# Patient Record
Sex: FEMALE | Race: WHITE | Employment: UNEMPLOYED | URBAN - NONMETROPOLITAN AREA
[De-identification: names, ages, dates, MRNs, and addresses within clinical notes are randomized per-mention and may not be internally consistent; named-entity substitution may affect disease eponyms.]

---

## 2018-04-18 ENCOUNTER — LAB ENCOUNTER (OUTPATIENT)
Dept: LAB | Age: 38
End: 2018-04-18
Attending: FAMILY MEDICINE
Payer: COMMERCIAL

## 2018-04-18 ENCOUNTER — OFFICE VISIT (OUTPATIENT)
Dept: FAMILY MEDICINE CLINIC | Facility: CLINIC | Age: 38
End: 2018-04-18

## 2018-04-18 VITALS
BODY MASS INDEX: 39.06 KG/M2 | WEIGHT: 223.25 LBS | TEMPERATURE: 98 F | DIASTOLIC BLOOD PRESSURE: 68 MMHG | HEIGHT: 63.5 IN | HEART RATE: 66 BPM | SYSTOLIC BLOOD PRESSURE: 110 MMHG | OXYGEN SATURATION: 98 %

## 2018-04-18 DIAGNOSIS — L85.3 DRY SKIN: ICD-10-CM

## 2018-04-18 DIAGNOSIS — R63.5 WEIGHT GAIN: ICD-10-CM

## 2018-04-18 DIAGNOSIS — E65 OBESE ABDOMEN: ICD-10-CM

## 2018-04-18 DIAGNOSIS — L85.3 DRY SKIN: Primary | ICD-10-CM

## 2018-04-18 PROCEDURE — 80061 LIPID PANEL: CPT

## 2018-04-18 PROCEDURE — 36415 COLL VENOUS BLD VENIPUNCTURE: CPT

## 2018-04-18 PROCEDURE — 99214 OFFICE O/P EST MOD 30 MIN: CPT | Performed by: INTERNAL MEDICINE

## 2018-04-18 PROCEDURE — 84443 ASSAY THYROID STIM HORMONE: CPT

## 2018-04-18 PROCEDURE — 80050 GENERAL HEALTH PANEL: CPT

## 2018-04-18 PROCEDURE — 85025 COMPLETE CBC W/AUTO DIFF WBC: CPT

## 2018-04-18 RX ORDER — ACETAMINOPHEN AND CODEINE PHOSPHATE 120; 12 MG/5ML; MG/5ML
0.35 SOLUTION ORAL DAILY
Refills: 11 | COMMUNITY
Start: 2018-04-14 | End: 2019-09-11 | Stop reason: ALTCHOICE

## 2018-04-18 RX ORDER — BETAMETHASONE DIPROPIONATE 0.5 MG/G
1 CREAM TOPICAL 2 TIMES DAILY
Qty: 50 G | Refills: 0 | Status: SHIPPED | OUTPATIENT
Start: 2018-04-18 | End: 2018-05-18

## 2018-04-18 NOTE — PROGRESS NOTES
Corinna New is a 30year old female. HPI:   Dry patches on the left lateral leg, father and uncle have psoriasis. She has no joint swelling, but pain in the right knee occasionally. She finds it very hard to lose weight.   She has a strong family hx of he and obesity.      Orders Placed This Encounter      TSH      Lipid Panel [E]      Comp Metabolic Panel (14) [E]      CBC W Differential W Platelet [E]    Meds & Refills for this Visit:  Signed Prescriptions Disp Refills    Betamethasone Dipropionate Aug 0.0

## 2019-03-13 ENCOUNTER — PATIENT OUTREACH (OUTPATIENT)
Dept: FAMILY MEDICINE CLINIC | Facility: CLINIC | Age: 39
End: 2019-03-13

## 2019-09-05 ENCOUNTER — TELEPHONE (OUTPATIENT)
Dept: FAMILY MEDICINE CLINIC | Facility: CLINIC | Age: 39
End: 2019-09-05

## 2019-09-11 ENCOUNTER — OFFICE VISIT (OUTPATIENT)
Dept: FAMILY MEDICINE CLINIC | Facility: CLINIC | Age: 39
End: 2019-09-11

## 2019-09-11 ENCOUNTER — LABORATORY ENCOUNTER (OUTPATIENT)
Dept: LAB | Age: 39
End: 2019-09-11
Attending: FAMILY MEDICINE
Payer: COMMERCIAL

## 2019-09-11 VITALS
BODY MASS INDEX: 34.3 KG/M2 | HEART RATE: 66 BPM | DIASTOLIC BLOOD PRESSURE: 76 MMHG | WEIGHT: 196 LBS | SYSTOLIC BLOOD PRESSURE: 100 MMHG | RESPIRATION RATE: 20 BRPM | HEIGHT: 63.5 IN | TEMPERATURE: 99 F

## 2019-09-11 DIAGNOSIS — Z00.00 WELL ADULT EXAM: ICD-10-CM

## 2019-09-11 DIAGNOSIS — Z00.00 WELL ADULT EXAM: Primary | ICD-10-CM

## 2019-09-11 LAB
ALBUMIN SERPL-MCNC: 4 G/DL (ref 3.4–5)
ALBUMIN/GLOB SERPL: 1.3 {RATIO} (ref 1–2)
ALP LIVER SERPL-CCNC: 59 U/L (ref 37–98)
ALT SERPL-CCNC: 23 U/L (ref 13–56)
ANION GAP SERPL CALC-SCNC: 9 MMOL/L (ref 0–18)
AST SERPL-CCNC: 11 U/L (ref 15–37)
BASOPHILS # BLD AUTO: 0.02 X10(3) UL (ref 0–0.2)
BASOPHILS NFR BLD AUTO: 0.3 %
BILIRUB SERPL-MCNC: 0.5 MG/DL (ref 0.1–2)
BUN BLD-MCNC: 15 MG/DL (ref 7–18)
BUN/CREAT SERPL: 17.6 (ref 10–20)
CALCIUM BLD-MCNC: 8.2 MG/DL (ref 8.5–10.1)
CHLORIDE SERPL-SCNC: 107 MMOL/L (ref 98–112)
CHOLEST SMN-MCNC: 240 MG/DL (ref ?–200)
CO2 SERPL-SCNC: 24 MMOL/L (ref 21–32)
CREAT BLD-MCNC: 0.85 MG/DL (ref 0.55–1.02)
DEPRECATED RDW RBC AUTO: 46.2 FL (ref 35.1–46.3)
EOSINOPHIL # BLD AUTO: 0.16 X10(3) UL (ref 0–0.7)
EOSINOPHIL NFR BLD AUTO: 2.6 %
ERYTHROCYTE [DISTWIDTH] IN BLOOD BY AUTOMATED COUNT: 13.6 % (ref 11–15)
GLOBULIN PLAS-MCNC: 3.1 G/DL (ref 2.8–4.4)
GLUCOSE BLD-MCNC: 92 MG/DL (ref 70–99)
HCT VFR BLD AUTO: 40.3 % (ref 35–48)
HDLC SERPL-MCNC: 47 MG/DL (ref 40–59)
HGB BLD-MCNC: 13.6 G/DL (ref 12–16)
IMM GRANULOCYTES # BLD AUTO: 0.02 X10(3) UL (ref 0–1)
IMM GRANULOCYTES NFR BLD: 0.3 %
LDLC SERPL CALC-MCNC: 173 MG/DL (ref ?–100)
LYMPHOCYTES # BLD AUTO: 1.75 X10(3) UL (ref 1–4)
LYMPHOCYTES NFR BLD AUTO: 28.8 %
M PROTEIN MFR SERPL ELPH: 7.1 G/DL (ref 6.4–8.2)
MCH RBC QN AUTO: 31 PG (ref 26–34)
MCHC RBC AUTO-ENTMCNC: 33.7 G/DL (ref 31–37)
MCV RBC AUTO: 91.8 FL (ref 80–100)
MONOCYTES # BLD AUTO: 0.56 X10(3) UL (ref 0.1–1)
MONOCYTES NFR BLD AUTO: 9.2 %
NEUTROPHILS # BLD AUTO: 3.56 X10 (3) UL (ref 1.5–7.7)
NEUTROPHILS # BLD AUTO: 3.56 X10(3) UL (ref 1.5–7.7)
NEUTROPHILS NFR BLD AUTO: 58.8 %
NONHDLC SERPL-MCNC: 193 MG/DL (ref ?–130)
OSMOLALITY SERPL CALC.SUM OF ELEC: 290 MOSM/KG (ref 275–295)
PLATELET # BLD AUTO: 252 10(3)UL (ref 150–450)
POTASSIUM SERPL-SCNC: 3.7 MMOL/L (ref 3.5–5.1)
RBC # BLD AUTO: 4.39 X10(6)UL (ref 3.8–5.3)
SODIUM SERPL-SCNC: 140 MMOL/L (ref 136–145)
TRIGL SERPL-MCNC: 99 MG/DL (ref 30–149)
VLDLC SERPL CALC-MCNC: 20 MG/DL (ref 0–30)
WBC # BLD AUTO: 6.1 X10(3) UL (ref 4–11)

## 2019-09-11 PROCEDURE — 88175 CYTOPATH C/V AUTO FLUID REDO: CPT | Performed by: INTERNAL MEDICINE

## 2019-09-11 PROCEDURE — 85025 COMPLETE CBC W/AUTO DIFF WBC: CPT

## 2019-09-11 PROCEDURE — 80061 LIPID PANEL: CPT

## 2019-09-11 PROCEDURE — 99395 PREV VISIT EST AGE 18-39: CPT | Performed by: INTERNAL MEDICINE

## 2019-09-11 PROCEDURE — 36415 COLL VENOUS BLD VENIPUNCTURE: CPT

## 2019-09-11 PROCEDURE — 80053 COMPREHEN METABOLIC PANEL: CPT

## 2019-09-11 NOTE — PROGRESS NOTES
HPI:   Yari Villa is a 42year old female who presents for a complete physical exam. Symptoms: denies discharge, itching, burning or dysuria, periods are regular. Patient complains of nothing.        There is no immunization history on file for this auorra nourished,in no apparent distress  SKIN: no rashes,no suspicious lesions  HEENT: atraumatic, normocephalic,ears and throat are clear  EYES:PERRLA, EOMI, normal optic disk,conjunctiva are clear  NECK: supple,no adenopathy,no bruits  CHEST: no chest tenderne

## 2019-09-12 DIAGNOSIS — E78.00 ELEVATED CHOLESTEROL: Primary | ICD-10-CM

## 2020-01-21 ENCOUNTER — TELEPHONE (OUTPATIENT)
Dept: FAMILY MEDICINE CLINIC | Facility: CLINIC | Age: 40
End: 2020-01-21

## 2020-01-21 DIAGNOSIS — Z00.00 WELL ADULT EXAM: Primary | ICD-10-CM

## 2020-01-21 NOTE — TELEPHONE ENCOUNTER
No, but sometimes eating habits changes with moods, I will order labs and she can decide if this is the right time.

## 2020-01-21 NOTE — TELEPHONE ENCOUNTER
Pt. Called and scheduled her fasting lab appt. For this Thursday. She is asking if stress could affect the results as she lost her grandmother and dog all in the last week.

## 2020-02-05 ENCOUNTER — LABORATORY ENCOUNTER (OUTPATIENT)
Dept: LAB | Age: 40
End: 2020-02-05
Attending: FAMILY MEDICINE
Payer: COMMERCIAL

## 2020-02-05 ENCOUNTER — OFFICE VISIT (OUTPATIENT)
Dept: FAMILY MEDICINE CLINIC | Facility: CLINIC | Age: 40
End: 2020-02-05

## 2020-02-05 VITALS
DIASTOLIC BLOOD PRESSURE: 74 MMHG | WEIGHT: 188 LBS | OXYGEN SATURATION: 98 % | HEART RATE: 69 BPM | HEIGHT: 63.5 IN | RESPIRATION RATE: 16 BRPM | BODY MASS INDEX: 32.9 KG/M2 | SYSTOLIC BLOOD PRESSURE: 110 MMHG

## 2020-02-05 DIAGNOSIS — Z00.00 WELL ADULT EXAM: ICD-10-CM

## 2020-02-05 DIAGNOSIS — L02.412 CUTANEOUS ABSCESS OF LEFT AXILLA: Primary | ICD-10-CM

## 2020-02-05 DIAGNOSIS — E78.00 ELEVATED CHOLESTEROL: ICD-10-CM

## 2020-02-05 LAB
ALBUMIN SERPL-MCNC: 4 G/DL (ref 3.4–5)
ALBUMIN/GLOB SERPL: 1.1 {RATIO} (ref 1–2)
ALP LIVER SERPL-CCNC: 51 U/L (ref 37–98)
ALT SERPL-CCNC: 21 U/L (ref 13–56)
ANION GAP SERPL CALC-SCNC: 6 MMOL/L (ref 0–18)
AST SERPL-CCNC: 11 U/L (ref 15–37)
BASOPHILS # BLD AUTO: 0.04 X10(3) UL (ref 0–0.2)
BASOPHILS NFR BLD AUTO: 0.5 %
BILIRUB SERPL-MCNC: 0.5 MG/DL (ref 0.1–2)
BUN BLD-MCNC: 10 MG/DL (ref 7–18)
BUN/CREAT SERPL: 11.4 (ref 10–20)
CALCIUM BLD-MCNC: 9.2 MG/DL (ref 8.5–10.1)
CHLORIDE SERPL-SCNC: 105 MMOL/L (ref 98–112)
CHOLEST SMN-MCNC: 233 MG/DL (ref ?–200)
CO2 SERPL-SCNC: 25 MMOL/L (ref 21–32)
CREAT BLD-MCNC: 0.88 MG/DL (ref 0.55–1.02)
DEPRECATED RDW RBC AUTO: 46.5 FL (ref 35.1–46.3)
EOSINOPHIL # BLD AUTO: 0.19 X10(3) UL (ref 0–0.7)
EOSINOPHIL NFR BLD AUTO: 2.3 %
ERYTHROCYTE [DISTWIDTH] IN BLOOD BY AUTOMATED COUNT: 13.5 % (ref 11–15)
GLOBULIN PLAS-MCNC: 3.5 G/DL (ref 2.8–4.4)
GLUCOSE BLD-MCNC: 88 MG/DL (ref 70–99)
HCT VFR BLD AUTO: 41.4 % (ref 35–48)
HDLC SERPL-MCNC: 55 MG/DL (ref 40–59)
HGB BLD-MCNC: 13.8 G/DL (ref 12–16)
IMM GRANULOCYTES # BLD AUTO: 0.02 X10(3) UL (ref 0–1)
IMM GRANULOCYTES NFR BLD: 0.2 %
LDLC SERPL CALC-MCNC: 165 MG/DL (ref ?–100)
LYMPHOCYTES # BLD AUTO: 1.55 X10(3) UL (ref 1–4)
LYMPHOCYTES NFR BLD AUTO: 18.9 %
M PROTEIN MFR SERPL ELPH: 7.5 G/DL (ref 6.4–8.2)
MCH RBC QN AUTO: 31.2 PG (ref 26–34)
MCHC RBC AUTO-ENTMCNC: 33.3 G/DL (ref 31–37)
MCV RBC AUTO: 93.7 FL (ref 80–100)
MONOCYTES # BLD AUTO: 0.57 X10(3) UL (ref 0.1–1)
MONOCYTES NFR BLD AUTO: 7 %
NEUTROPHILS # BLD AUTO: 5.81 X10 (3) UL (ref 1.5–7.7)
NEUTROPHILS # BLD AUTO: 5.81 X10(3) UL (ref 1.5–7.7)
NEUTROPHILS NFR BLD AUTO: 71.1 %
NONHDLC SERPL-MCNC: 178 MG/DL (ref ?–130)
OSMOLALITY SERPL CALC.SUM OF ELEC: 280 MOSM/KG (ref 275–295)
PATIENT FASTING Y/N/NP: YES
PATIENT FASTING Y/N/NP: YES
PLATELET # BLD AUTO: 282 10(3)UL (ref 150–450)
POTASSIUM SERPL-SCNC: 3.9 MMOL/L (ref 3.5–5.1)
RBC # BLD AUTO: 4.42 X10(6)UL (ref 3.8–5.3)
SODIUM SERPL-SCNC: 136 MMOL/L (ref 136–145)
TRIGL SERPL-MCNC: 66 MG/DL (ref 30–149)
VLDLC SERPL CALC-MCNC: 13 MG/DL (ref 0–30)
WBC # BLD AUTO: 8.2 X10(3) UL (ref 4–11)

## 2020-02-05 PROCEDURE — 85025 COMPLETE CBC W/AUTO DIFF WBC: CPT

## 2020-02-05 PROCEDURE — 80053 COMPREHEN METABOLIC PANEL: CPT

## 2020-02-05 PROCEDURE — 99214 OFFICE O/P EST MOD 30 MIN: CPT | Performed by: INTERNAL MEDICINE

## 2020-02-05 PROCEDURE — 80061 LIPID PANEL: CPT

## 2020-02-05 PROCEDURE — 36415 COLL VENOUS BLD VENIPUNCTURE: CPT

## 2020-02-05 RX ORDER — SULFAMETHOXAZOLE AND TRIMETHOPRIM 800; 160 MG/1; MG/1
1 TABLET ORAL 2 TIMES DAILY
Qty: 28 TABLET | Refills: 0 | Status: SHIPPED | OUTPATIENT
Start: 2020-02-05 | End: 2020-02-19

## 2020-02-05 NOTE — PROGRESS NOTES
Jen Syed is a 42year old female. HPI:   Pt has been having pain in the left lateral chest for the last 6 days. Red, warm, tender spot 10 cm inferior to axilla.    Current Outpatient Medications   Medication Sig Dispense Refill   • Sulfamethoxazole-T tablet 0     Sig: Take 1 tablet by mouth 2 (two) times daily for 14 days. Imaging & Consults:  None    Follow up as needed. The patient indicates understanding of these issues and agrees to the plan.

## 2020-05-20 ENCOUNTER — VIRTUAL PHONE E/M (OUTPATIENT)
Dept: FAMILY MEDICINE CLINIC | Facility: CLINIC | Age: 40
End: 2020-05-20

## 2020-05-20 DIAGNOSIS — J01.00 ACUTE NON-RECURRENT MAXILLARY SINUSITIS: Primary | ICD-10-CM

## 2020-05-20 PROCEDURE — 99213 OFFICE O/P EST LOW 20 MIN: CPT | Performed by: INTERNAL MEDICINE

## 2020-05-20 RX ORDER — PREDNISONE 20 MG/1
40 TABLET ORAL DAILY
Qty: 14 TABLET | Refills: 0 | Status: SHIPPED | OUTPATIENT
Start: 2020-05-20 | End: 2020-05-27

## 2020-05-20 RX ORDER — CIPROFLOXACIN 500 MG/1
500 TABLET, FILM COATED ORAL 2 TIMES DAILY
Qty: 20 TABLET | Refills: 0 | Status: SHIPPED | OUTPATIENT
Start: 2020-05-20 | End: 2020-05-30

## 2020-05-20 NOTE — PROGRESS NOTES
HPI:   Kimberly Wilkins is a 42year old female who presents for upper respiratory symptoms for  14  days. Patient reports sore throat only at the beginning of sx's, congestion, dry cough, cough is keeping pt up at night.     Current Outpatient Medications   M issues and agrees to the plan. The patient is asked to return if sx's persist or worsen.

## 2020-05-21 ENCOUNTER — TELEPHONE (OUTPATIENT)
Dept: FAMILY MEDICINE CLINIC | Facility: CLINIC | Age: 40
End: 2020-05-21

## 2020-05-21 RX ORDER — PROMETHAZINE HYDROCHLORIDE AND CODEINE PHOSPHATE 6.25; 1 MG/5ML; MG/5ML
5 SOLUTION ORAL NIGHTLY
Qty: 50 ML | Refills: 0 | Status: SHIPPED | OUTPATIENT
Start: 2020-05-21 | End: 2020-06-04

## 2020-05-21 NOTE — TELEPHONE ENCOUNTER
Patient states that she took 2 of the prednisone at the same time last night. She became very hot and flushed. Patient is asking if she should take BID instead of once daily.   She also states that she did not sleep last night because she was up coughing

## 2020-05-28 ENCOUNTER — TELEPHONE (OUTPATIENT)
Dept: FAMILY MEDICINE CLINIC | Facility: CLINIC | Age: 40
End: 2020-05-28

## 2020-05-28 NOTE — TELEPHONE ENCOUNTER
Spoke with pt. States she had a virtual visit with Dr. Radha Buenrostro on 5/20 for sinusitis. Was prescribed prednisone x7days and Cipro [id number]. C/o Cipro causing her to feel \"loopy\", dizzy, insomnia, nightmares, nausea.    States this started a few days ago, so

## 2020-05-28 NOTE — TELEPHONE ENCOUNTER
Ciprofloxacin HCl (CIPRO) 500 MG Oral Tab  She said she is having terrible side effects, please call pt.

## 2020-06-22 ENCOUNTER — TELEPHONE (OUTPATIENT)
Dept: FAMILY MEDICINE CLINIC | Facility: CLINIC | Age: 40
End: 2020-06-22

## 2020-06-22 ENCOUNTER — TELEMEDICINE (OUTPATIENT)
Dept: FAMILY MEDICINE CLINIC | Facility: CLINIC | Age: 40
End: 2020-06-22

## 2020-06-22 DIAGNOSIS — J01.91 ACUTE RECURRENT SINUSITIS, UNSPECIFIED LOCATION: Primary | ICD-10-CM

## 2020-06-22 DIAGNOSIS — J30.9 ALLERGIC RHINITIS, UNSPECIFIED SEASONALITY, UNSPECIFIED TRIGGER: ICD-10-CM

## 2020-06-22 DIAGNOSIS — R06.02 SOB (SHORTNESS OF BREATH) ON EXERTION: ICD-10-CM

## 2020-06-22 DIAGNOSIS — R05.3 PERSISTENT COUGH: ICD-10-CM

## 2020-06-22 PROCEDURE — 99213 OFFICE O/P EST LOW 20 MIN: CPT | Performed by: NURSE PRACTITIONER

## 2020-06-22 RX ORDER — PREDNISONE 20 MG/1
20 TABLET ORAL 2 TIMES DAILY
Qty: 10 TABLET | Refills: 0 | Status: SHIPPED | OUTPATIENT
Start: 2020-06-22 | End: 2020-06-27

## 2020-06-22 RX ORDER — AZITHROMYCIN 250 MG/1
TABLET, FILM COATED ORAL
Qty: 6 TABLET | Refills: 0 | Status: SHIPPED | OUTPATIENT
Start: 2020-06-22 | End: 2020-06-27

## 2020-06-22 RX ORDER — ALBUTEROL SULFATE 90 UG/1
2 AEROSOL, METERED RESPIRATORY (INHALATION) EVERY 4 HOURS PRN
Qty: 1 INHALER | Refills: 0 | Status: SHIPPED | OUTPATIENT
Start: 2020-06-22 | End: 2020-09-09

## 2020-06-22 NOTE — PROGRESS NOTES
Virtual/Telephone Check-In    Shantanu Pulido  verbally consents to a [employer] on 6/22/2020 . Patient understands and accepts financial responsibility for any deductible, co-insurance and/or co-pays associated with this service. OTHER (SEE COMMENTS)    Comment:Felt \"loopy\"; dizzy.  Insomnia, nightmares, nausea    Current Outpatient Medications   Medication Sig Dispense Refill   • azithromycin 250 MG Oral Tab Take 2 tablets (500 mg total) by mouth daily for 1 day, THEN 1 tablet (2 unspecified seasonality, unspecified trigger    Problem List Items Addressed This Visit     None      Visit Diagnoses     Acute recurrent sinusitis, unspecified location    -  Primary    Relevant Medications    azithromycin 250 MG Oral Tab    Persistent co for 4 days. • predniSONE 20 MG Oral Tab 10 tablet 0     Sig: Take 1 tablet (20 mg total) by mouth 2 (two) times daily for 5 days.    • Albuterol Sulfate  (90 Base) MCG/ACT Inhalation Aero Soln 1 Inhaler 0     Sig: Inhale 2 puffs into the lungs ever

## 2020-06-22 NOTE — TELEPHONE ENCOUNTER
Virtual/Telephone Check-In    Shantanu Pulido verbally {consents to a [facility] on 06/22/20. Patient has been referred to the [facility] website at [email or web address] to review the yearly Consent to Treat document.   Patient Nisreen

## 2020-06-23 ENCOUNTER — LAB ENCOUNTER (OUTPATIENT)
Dept: LAB | Facility: HOSPITAL | Age: 40
End: 2020-06-23
Attending: NURSE PRACTITIONER
Payer: COMMERCIAL

## 2020-06-23 DIAGNOSIS — R05.3 PERSISTENT COUGH: ICD-10-CM

## 2020-06-23 DIAGNOSIS — R06.02 SOB (SHORTNESS OF BREATH) ON EXERTION: ICD-10-CM

## 2020-06-25 ENCOUNTER — OFFICE VISIT (OUTPATIENT)
Dept: FAMILY MEDICINE CLINIC | Facility: CLINIC | Age: 40
End: 2020-06-25

## 2020-06-25 ENCOUNTER — HOSPITAL ENCOUNTER (OUTPATIENT)
Dept: GENERAL RADIOLOGY | Age: 40
Discharge: HOME | End: 2020-06-25
Attending: NURSE PRACTITIONER
Payer: COMMERCIAL

## 2020-06-25 VITALS
BODY MASS INDEX: 33.62 KG/M2 | RESPIRATION RATE: 16 BRPM | SYSTOLIC BLOOD PRESSURE: 112 MMHG | HEART RATE: 68 BPM | WEIGHT: 192.13 LBS | TEMPERATURE: 98 F | OXYGEN SATURATION: 99 % | HEIGHT: 63.5 IN | DIASTOLIC BLOOD PRESSURE: 78 MMHG

## 2020-06-25 DIAGNOSIS — R05.3 PERSISTENT COUGH: ICD-10-CM

## 2020-06-25 DIAGNOSIS — R05.9 COUGH: Primary | ICD-10-CM

## 2020-06-25 DIAGNOSIS — J20.9 ACUTE BRONCHITIS, UNSPECIFIED ORGANISM: Primary | ICD-10-CM

## 2020-06-25 DIAGNOSIS — R05.9 COUGH: ICD-10-CM

## 2020-06-25 PROCEDURE — 99214 OFFICE O/P EST MOD 30 MIN: CPT | Performed by: NURSE PRACTITIONER

## 2020-06-25 PROCEDURE — 71046 X-RAY EXAM CHEST 2 VIEWS: CPT | Performed by: NURSE PRACTITIONER

## 2020-06-25 RX ORDER — CEFUROXIME AXETIL 250 MG/1
250 TABLET ORAL 2 TIMES DAILY
Qty: 14 TABLET | Refills: 0 | Status: SHIPPED | OUTPATIENT
Start: 2020-06-25 | End: 2020-07-02

## 2020-06-25 RX ORDER — ALBUTEROL SULFATE 2.5 MG/3ML
2.5 SOLUTION RESPIRATORY (INHALATION) EVERY 4 HOURS PRN
Qty: 1 BOX | Refills: 0 | Status: SHIPPED | OUTPATIENT
Start: 2020-06-25 | End: 2020-11-10

## 2020-06-25 NOTE — PROGRESS NOTES
HPI:   Cough   This is a recurrent problem. Episode onset: about 1 week ago, had telehealth visit on 6/22, tested negative for COVID 19.  The problem has been gradually improving (took last dose of Azithromycin today, will take last dose of Prednisone ton Current Every Day Smoker        Packs/day: 0.50      Smokeless tobacco: Never Used      Tobacco comment: decreasing    Alcohol use: No    Drug use: No        REVIEW OF SYSTEMS:   Review of Systems   Constitutional: Negative for chills, fatigue and fever.

## 2020-07-07 ENCOUNTER — TELEPHONE (OUTPATIENT)
Dept: FAMILY MEDICINE CLINIC | Facility: CLINIC | Age: 40
End: 2020-07-07

## 2020-07-07 NOTE — TELEPHONE ENCOUNTER
Pt came back from vacation with her family and was tested for covid. Her family came back negative and she tested positive, how can that be? Wants to go get rapid test done in [place]. Will Rosanna order?  I tried to make a appt but she wants to talk to the [facility]

## 2020-07-07 NOTE — TELEPHONE ENCOUNTER
I think its possible, but they would all have to be re screened to prove she was the false positive. If I order it it has to be done through Brenda Gomez.  Or she has to go to an [facility] site

## 2020-07-07 NOTE — TELEPHONE ENCOUNTER
Patient said that they drove down to [place] and came back on Sunday and got tested at a government facility in [facility] where her  works.  She said that she and her family have not been social distancing and does not understand why she would be

## 2020-07-07 NOTE — TELEPHONE ENCOUNTER
Patient advised. She said she was hoping for a rapid test. She said that she will look on the [facility] website to find a rapid testing facility. Advised they all need to quarantine until results obtained.

## 2020-08-03 ENCOUNTER — TELEMEDICINE (OUTPATIENT)
Dept: FAMILY MEDICINE CLINIC | Facility: CLINIC | Age: 40
End: 2020-08-03

## 2020-08-03 DIAGNOSIS — J20.9 BRONCHITIS WITH BRONCHOSPASM: Primary | ICD-10-CM

## 2020-08-03 PROCEDURE — 99214 OFFICE O/P EST MOD 30 MIN: CPT | Performed by: INTERNAL MEDICINE

## 2020-08-03 RX ORDER — AMOXICILLIN AND CLAVULANATE POTASSIUM 875; 125 MG/1; MG/1
1 TABLET, FILM COATED ORAL 2 TIMES DAILY
Qty: 20 TABLET | Refills: 0 | Status: SHIPPED | OUTPATIENT
Start: 2020-08-03 | End: 2020-08-13

## 2020-08-03 RX ORDER — PREDNISONE 20 MG/1
TABLET ORAL
Qty: 18 TABLET | Refills: 0 | Status: SHIPPED | OUTPATIENT
Start: 2020-08-03 | End: 2020-08-13

## 2020-08-03 RX ORDER — ALBUTEROL SULFATE 2.5 MG/3ML
2.5 SOLUTION RESPIRATORY (INHALATION) EVERY 6 HOURS PRN
Qty: 50 VIAL | Refills: 1 | Status: SHIPPED | OUTPATIENT
Start: 2020-08-03 | End: 2020-08-17

## 2020-08-03 NOTE — PROGRESS NOTES
HPI:   Oral Luna is a 47year old female who presents for upper respiratory symptoms for  6  days. Patient reports congestion, dry cough, OTC cold meds have not been helping. Using nebulizer and zytrec. No fever, no sweats, no chills.   Had COVID keren pain  NEURO: denies headaches    EXAM:   [facility] 06/10/2020     ASSESSMENT AND PLAN:   Niko Cosme is a 47year old female who presents with Bronchitis.  PLAN: albuterol, steroid and antibiotic and if no impovement in 2 days consider other non-infectious etio

## 2020-08-06 ENCOUNTER — TELEPHONE (OUTPATIENT)
Dept: FAMILY MEDICINE CLINIC | Facility: CLINIC | Age: 40
End: 2020-08-06

## 2020-08-06 RX ORDER — CETIRIZINE HYDROCHLORIDE 10 MG/1
10 TABLET ORAL DAILY
Qty: 30 TABLET | Refills: 0 | Status: SHIPPED | OUTPATIENT
Start: 2020-08-06 | End: 2020-09-09

## 2020-08-06 NOTE — TELEPHONE ENCOUNTER
I can do that, but I think if she is that bad we need to rule out PE or other noninfectious problem that cause SOB.

## 2020-08-06 NOTE — TELEPHONE ENCOUNTER
Patient is still SOB especially with exertion and has chest heaviness with some wheezing. She has been doing Albuterol neb treatments every 4 hours, the antibiotics and Prednisone.  Patient advised that she needs to go to [facility]  to be evaluated and possi

## 2020-09-04 ENCOUNTER — TELEPHONE (OUTPATIENT)
Dept: FAMILY MEDICINE CLINIC | Facility: CLINIC | Age: 40
End: 2020-09-04

## 2020-09-04 DIAGNOSIS — J20.9 BRONCHITIS WITH BRONCHOSPASM: Primary | ICD-10-CM

## 2020-09-04 DIAGNOSIS — R05.3 PERSISTENT COUGH: ICD-10-CM

## 2020-09-04 NOTE — TELEPHONE ENCOUNTER
Jaquelin with [facility] is calling to see if a referral was ever sent in for the nebulizer that she received

## 2020-09-09 DIAGNOSIS — R05.3 PERSISTENT COUGH: ICD-10-CM

## 2020-09-09 DIAGNOSIS — R06.02 SOB (SHORTNESS OF BREATH) ON EXERTION: ICD-10-CM

## 2020-09-09 RX ORDER — CETIRIZINE HYDROCHLORIDE 10 MG/1
10 TABLET ORAL DAILY
Qty: 30 TABLET | Refills: 0 | Status: SHIPPED | OUTPATIENT
Start: 2020-09-09 | End: 2020-11-12

## 2020-09-09 RX ORDER — ALBUTEROL SULFATE 90 UG/1
2 AEROSOL, METERED RESPIRATORY (INHALATION) EVERY 4 HOURS PRN
Qty: 1 INHALER | Refills: 0 | Status: SHIPPED | OUTPATIENT
Start: 2020-09-09 | End: 2020-11-10

## 2020-09-09 RX ORDER — MONTELUKAST SODIUM 10 MG/1
10 TABLET ORAL NIGHTLY
Qty: 30 TABLET | Refills: 0 | Status: SHIPPED | OUTPATIENT
Start: 2020-09-09 | End: 2020-10-14

## 2020-09-09 NOTE — TELEPHONE ENCOUNTER
Per Leigh Ann Young at [facility] a nebulizer was dispensed to patient on 6/25/20 by Daya Dumont NP. They need a retro referral placed. With CPT codes; [id number], , . Is the diagnosis acute bronchitis?

## 2020-09-09 NOTE — TELEPHONE ENCOUNTER
Patient advised that she can try Singulair or go to an allergist and they will have her stop meds. Patient said that she wants to try Singulair for 1 month and see how she does. Script sent to [facility] [place].  Patient also advised to call insurance and

## 2020-09-09 NOTE — TELEPHONE ENCOUNTER
Patient said that she started taking the Certirizine about 1 month ago and she has not noticed a difference. She said she still has chest heaviness, SOB, and drainage, wheezing at night. She denies fever.  Should she switch medicine and can she see an aller

## 2020-09-09 NOTE — TELEPHONE ENCOUNTER
PATIENT WAS PUT ON ALLERGY MED IN AUGUST. IS NOT BETTER IS THERE A BIGGER DOSE. CAN SHE GET ALLERGY TESTING DONE.  SHE DOES NEED A REFILL ON THE MEDICATION CETIRIVINE/ [facility] AND NEEDS ALBUTERAL REFILL ALSO

## 2020-09-17 ENCOUNTER — TELEPHONE (OUTPATIENT)
Dept: FAMILY MEDICINE CLINIC | Facility: CLINIC | Age: 40
End: 2020-09-17

## 2020-09-17 RX ORDER — SULFAMETHOXAZOLE AND TRIMETHOPRIM 800; 160 MG/1; MG/1
1 TABLET ORAL 2 TIMES DAILY
Qty: 20 TABLET | Refills: 0 | Status: SHIPPED | OUTPATIENT
Start: 2020-09-17 | End: 2020-10-01

## 2020-09-17 NOTE — TELEPHONE ENCOUNTER
SHE NEEDS MORE MEDICATION FOR THE ABSCESS SHE HAD A COUPLE MONTHS AGO       [address] IN [facility]

## 2020-09-17 NOTE — TELEPHONE ENCOUNTER
Patient said that she had an abscess a couple months ago near her bra line that went away an now it is \"back\". She said that it is not as bad but the area is \"hard and red\". She said she has not applied any warm compresses. She denies drainage.  She is

## 2020-09-17 NOTE — TELEPHONE ENCOUNTER
2/5/2020 she had an abscess in the  left lateral chest-- 10 cm inferior to axilla (armpit) id this the spot? She can start bactrim, but it no improvement I want to see next week in office.

## 2020-09-29 ENCOUNTER — TELEPHONE (OUTPATIENT)
Dept: FAMILY MEDICINE CLINIC | Facility: CLINIC | Age: 40
End: 2020-09-29

## 2020-09-29 NOTE — TELEPHONE ENCOUNTER
Patient said that the cyst along her bra line has gotten smaller but it is still there, she said it is the size of a quarter. She said she finished the antibiotics Sunday and has been applying hot compresses.  She has not been seen for this particular spot Statement Selected

## 2020-10-01 ENCOUNTER — OFFICE VISIT (OUTPATIENT)
Dept: FAMILY MEDICINE CLINIC | Facility: CLINIC | Age: 40
End: 2020-10-01

## 2020-10-01 VITALS
HEIGHT: 63.5 IN | OXYGEN SATURATION: 97 % | DIASTOLIC BLOOD PRESSURE: 80 MMHG | HEART RATE: 66 BPM | TEMPERATURE: 99 F | SYSTOLIC BLOOD PRESSURE: 120 MMHG | RESPIRATION RATE: 16 BRPM | BODY MASS INDEX: 35 KG/M2 | WEIGHT: 200 LBS

## 2020-10-01 DIAGNOSIS — J30.9 ALLERGIC RHINITIS, UNSPECIFIED SEASONALITY, UNSPECIFIED TRIGGER: Primary | ICD-10-CM

## 2020-10-01 DIAGNOSIS — J32.9 SINUS ABSCESS: ICD-10-CM

## 2020-10-01 DIAGNOSIS — L02.213 CUTANEOUS ABSCESS OF CHEST WALL: ICD-10-CM

## 2020-10-01 PROCEDURE — 99214 OFFICE O/P EST MOD 30 MIN: CPT | Performed by: INTERNAL MEDICINE

## 2020-10-01 PROCEDURE — 3008F BODY MASS INDEX DOCD: CPT | Performed by: INTERNAL MEDICINE

## 2020-10-01 PROCEDURE — 3074F SYST BP LT 130 MM HG: CPT | Performed by: INTERNAL MEDICINE

## 2020-10-01 PROCEDURE — 3079F DIAST BP 80-89 MM HG: CPT | Performed by: INTERNAL MEDICINE

## 2020-10-01 RX ORDER — SULFAMETHOXAZOLE AND TRIMETHOPRIM 800; 160 MG/1; MG/1
1 TABLET ORAL 2 TIMES DAILY
Qty: 20 TABLET | Refills: 0 | Status: SHIPPED | OUTPATIENT
Start: 2020-10-01 | End: 2020-10-11

## 2020-10-01 NOTE — PROGRESS NOTES
Diamante De La Cruz is a 47year old female. HPI:   Pt has a skin ascbess in the left axilla, around the bra line,  After 10 days of Bactrim it is much better, but not yet gone. Tenderness and redness improved.   She has had better control of her allergies and 09/17/2020   SpO2 97%   BMI 34.87 kg/m²   GENERAL: well developed, well nourished,in no apparent distress  SKIN: fluxuant nontender left lateral skin infection, resolving  HEENT: atraumatic, normocephalic,ears and throat are clear  NECK: supple,no adenopat

## 2020-10-13 ENCOUNTER — TELEPHONE (OUTPATIENT)
Dept: FAMILY MEDICINE CLINIC | Facility: CLINIC | Age: 40
End: 2020-10-13

## 2020-10-14 ENCOUNTER — TELEPHONE (OUTPATIENT)
Dept: FAMILY MEDICINE CLINIC | Facility: CLINIC | Age: 40
End: 2020-10-14

## 2020-10-14 DIAGNOSIS — J45.30 MILD PERSISTENT ASTHMA WITHOUT COMPLICATION: Primary | ICD-10-CM

## 2020-10-14 DIAGNOSIS — J30.2 SEASONAL ALLERGIC RHINITIS, UNSPECIFIED TRIGGER: ICD-10-CM

## 2020-10-14 RX ORDER — PREDNISONE 20 MG/1
40 TABLET ORAL DAILY
Qty: 14 TABLET | Refills: 0 | Status: SHIPPED | OUTPATIENT
Start: 2020-10-14 | End: 2020-10-21

## 2020-10-14 RX ORDER — MONTELUKAST SODIUM 10 MG/1
10 TABLET ORAL NIGHTLY
Qty: 30 TABLET | Refills: 0 | Status: SHIPPED | OUTPATIENT
Start: 2020-10-14 | End: 2021-04-10

## 2020-10-14 NOTE — TELEPHONE ENCOUNTER
Patient advised that Dr Maria Alejandra Pandya does not need to see her. Referral placed to Dr Lidia Almazan and is pending. Patient advised Dr Maria Alejandra Pandya refilled Singulair and sent script for Prednisone.

## 2020-10-14 NOTE — TELEPHONE ENCOUNTER
Patient said that she has been having breathing issues since February. She said she has been out of Singulair for 4 days. She has been taking Zyertic and using her Nebulizer every 4 hours.  She said she was camping over the weekend and was around a camp [place]

## 2020-10-14 NOTE — TELEPHONE ENCOUNTER
Patient advised. She said that she cannot come in tomorrow during the times Dr Juan Francisco Gomez has available. Does she need to be seen for the referral? If so should she see Kori Waddell NP?  She said the abscess is \"almost gone\" and she has a couple days left

## 2020-10-14 NOTE — TELEPHONE ENCOUNTER
HAS BEEN HAVING BREATHING ISSUES LATELY, USING NEBULIZER EVERY 4 HOURS, WANTS TO TALK TO NURSE ABOUT THIS, CALL PT

## 2020-10-14 NOTE — TELEPHONE ENCOUNTER
I think she needs a pulmonologist! I can refill Singulair, which was working well, and add prednisone and even evaluate her. She was treated for a skin abcess has this resolved?

## 2020-10-19 ENCOUNTER — TELEMEDICINE (OUTPATIENT)
Dept: FAMILY MEDICINE CLINIC | Facility: CLINIC | Age: 40
End: 2020-10-19

## 2020-10-19 DIAGNOSIS — R05.9 COUGH: Primary | ICD-10-CM

## 2020-10-19 PROCEDURE — 99213 OFFICE O/P EST LOW 20 MIN: CPT | Performed by: INTERNAL MEDICINE

## 2020-10-19 RX ORDER — ALBUTEROL SULFATE 90 UG/1
2 AEROSOL, METERED RESPIRATORY (INHALATION) EVERY 6 HOURS PRN
Qty: 1 INHALER | Refills: 3 | Status: SHIPPED | OUTPATIENT
Start: 2020-10-19 | End: 2021-10-13

## 2020-10-19 RX ORDER — ALBUTEROL SULFATE 2.5 MG/3ML
2.5 SOLUTION RESPIRATORY (INHALATION) EVERY 6 HOURS PRN
Qty: 50 VIAL | Refills: 3 | Status: SHIPPED | OUTPATIENT
Start: 2020-10-19 | End: 2020-11-18

## 2020-10-19 NOTE — PROGRESS NOTES
HPI:   Taylor Bravo is a 47year old female who presents for upper respiratory symptoms for  7  days. Patient reports congestion, dry cough, cough is keeping pt up at night, chest pain from coughing, denies fever. On steroid and albuterol.     Current Outp delaying treatment/procedure/surgery including the risks of COVID-19 exposure and infection which may lead to significant complications, morbidity and mortality including, but not limited to, quarantine/self-isolation, additional tests, hospitalization, me

## 2020-10-20 ENCOUNTER — APPOINTMENT (OUTPATIENT)
Dept: LAB | Age: 40
End: 2020-10-20
Attending: INTERNAL MEDICINE
Payer: COMMERCIAL

## 2020-10-20 ENCOUNTER — TELEPHONE (OUTPATIENT)
Dept: FAMILY MEDICINE CLINIC | Facility: CLINIC | Age: 40
End: 2020-10-20

## 2020-10-20 DIAGNOSIS — R05.9 COUGH: ICD-10-CM

## 2020-10-20 DIAGNOSIS — J45.30 MILD PERSISTENT ASTHMA WITHOUT COMPLICATION: ICD-10-CM

## 2020-10-20 DIAGNOSIS — J30.2 SEASONAL ALLERGIC RHINITIS, UNSPECIFIED TRIGGER: Primary | ICD-10-CM

## 2020-10-27 ENCOUNTER — TELEPHONE (OUTPATIENT)
Dept: FAMILY MEDICINE CLINIC | Facility: CLINIC | Age: 40
End: 2020-10-27

## 2020-11-12 ENCOUNTER — HOSPITAL ENCOUNTER (OUTPATIENT)
Dept: GENERAL RADIOLOGY | Age: 40
Discharge: HOME | End: 2020-11-12
Attending: ALLERGY & IMMUNOLOGY
Payer: COMMERCIAL

## 2020-11-12 ENCOUNTER — LAB ENCOUNTER (OUTPATIENT)
Dept: LAB | Age: 40
End: 2020-11-12
Attending: ALLERGY & IMMUNOLOGY
Payer: COMMERCIAL

## 2020-11-12 DIAGNOSIS — J44.9 CHRONIC ASTHMATIC BRONCHITIS (HCC): Primary | ICD-10-CM

## 2020-11-12 DIAGNOSIS — J44.9 CHRONIC ASTHMATIC BRONCHITIS (HCC): ICD-10-CM

## 2020-11-12 PROCEDURE — 82784 ASSAY IGA/IGD/IGG/IGM EACH: CPT

## 2020-11-12 PROCEDURE — 82785 ASSAY OF IGE: CPT

## 2020-11-12 PROCEDURE — 86941 HEMOLYSINS/AGGLUTININS: CPT

## 2020-11-12 PROCEDURE — 86160 COMPLEMENT ANTIGEN: CPT

## 2020-11-12 PROCEDURE — 82306 VITAMIN D 25 HYDROXY: CPT

## 2020-11-12 PROCEDURE — 86774 TETANUS ANTIBODY: CPT

## 2020-11-12 PROCEDURE — 86900 BLOOD TYPING SEROLOGIC ABO: CPT

## 2020-11-12 PROCEDURE — 86317 IMMUNOASSAY INFECTIOUS AGENT: CPT

## 2020-11-12 PROCEDURE — 71046 X-RAY EXAM CHEST 2 VIEWS: CPT | Performed by: ALLERGY & IMMUNOLOGY

## 2020-11-12 PROCEDURE — 85025 COMPLETE CBC W/AUTO DIFF WBC: CPT

## 2020-11-12 PROCEDURE — 86762 RUBELLA ANTIBODY: CPT

## 2020-11-12 PROCEDURE — 86162 COMPLEMENT TOTAL (CH50): CPT

## 2020-11-12 PROCEDURE — 36415 COLL VENOUS BLD VENIPUNCTURE: CPT

## 2020-11-12 PROCEDURE — 86648 DIPHTHERIA ANTIBODY: CPT

## 2020-11-12 RX ORDER — CETIRIZINE HYDROCHLORIDE 10 MG/1
10 TABLET ORAL DAILY
Qty: 30 TABLET | Refills: 0 | Status: SHIPPED | OUTPATIENT
Start: 2020-11-12 | End: 2021-06-16

## 2021-04-10 ENCOUNTER — OFFICE VISIT (OUTPATIENT)
Dept: FAMILY MEDICINE CLINIC | Facility: CLINIC | Age: 41
End: 2021-04-10

## 2021-04-10 VITALS
HEIGHT: 64 IN | RESPIRATION RATE: 16 BRPM | TEMPERATURE: 98 F | OXYGEN SATURATION: 97 % | BODY MASS INDEX: 36.54 KG/M2 | HEART RATE: 76 BPM | SYSTOLIC BLOOD PRESSURE: 120 MMHG | WEIGHT: 214 LBS | DIASTOLIC BLOOD PRESSURE: 80 MMHG

## 2021-04-10 DIAGNOSIS — L72.3 INFECTED SEBACEOUS CYST: Primary | ICD-10-CM

## 2021-04-10 DIAGNOSIS — L08.9 INFECTED SEBACEOUS CYST: Primary | ICD-10-CM

## 2021-04-10 PROCEDURE — 3074F SYST BP LT 130 MM HG: CPT | Performed by: INTERNAL MEDICINE

## 2021-04-10 PROCEDURE — 99213 OFFICE O/P EST LOW 20 MIN: CPT | Performed by: INTERNAL MEDICINE

## 2021-04-10 PROCEDURE — 3008F BODY MASS INDEX DOCD: CPT | Performed by: INTERNAL MEDICINE

## 2021-04-10 PROCEDURE — 3079F DIAST BP 80-89 MM HG: CPT | Performed by: INTERNAL MEDICINE

## 2021-04-10 RX ORDER — MONTELUKAST SODIUM 10 MG/1
10 TABLET ORAL NIGHTLY
Qty: 90 TABLET | Refills: 0 | Status: SHIPPED | OUTPATIENT
Start: 2021-04-10 | End: 2021-10-13

## 2021-04-10 RX ORDER — AMOXICILLIN AND CLAVULANATE POTASSIUM 500; 125 MG/1; MG/1
1 TABLET, FILM COATED ORAL 3 TIMES DAILY
Qty: 30 TABLET | Refills: 0 | Status: SHIPPED | OUTPATIENT
Start: 2021-04-10 | End: 2021-04-20

## 2021-04-11 NOTE — PROGRESS NOTES
Jen Syed is a 47year old female. HPI:   Pt has a painful red, tender area under the left armpit. This has happened in the past and tends to recur. She has been working out more.    Current Outpatient Medications   Medication Sig Dispense Refill   • Pulse 76   Temp 98 °F (36.7 °C) (Temporal)   Resp 16   Ht 5' 4\" (1.626 m)   Wt 214 lb (97.1 kg)   LMP 04/10/2021   SpO2 97%   BMI 36.73 kg/m²   GENERAL: well developed, well nourished,in no apparent distress  SKIN: left axillary infected cyst, red, tende

## 2021-04-27 ENCOUNTER — OFFICE VISIT (OUTPATIENT)
Dept: SURGERY | Facility: CLINIC | Age: 41
End: 2021-04-27

## 2021-04-27 VITALS — BODY MASS INDEX: 36.54 KG/M2 | HEART RATE: 84 BPM | HEIGHT: 64 IN | WEIGHT: 214 LBS

## 2021-04-27 DIAGNOSIS — R22.32 MASS OF LEFT AXILLA: Primary | ICD-10-CM

## 2021-04-27 PROCEDURE — 3008F BODY MASS INDEX DOCD: CPT | Performed by: SURGERY

## 2021-04-27 PROCEDURE — 99242 OFF/OP CONSLTJ NEW/EST SF 20: CPT | Performed by: SURGERY

## 2021-04-27 NOTE — H&P
Jolene Norton is a 47year old female  Patient presents with:  Lump: new patient referred by Dr. Helena Milan for left axilla lump. pt states it is painful at least twice a year and takes antibiotics. taking anitbiotics.        REFERRED BY    Patient presents with Tobacco Use      Smoking status: Current Every Day Smoker        Packs/day: 0.50      Smokeless tobacco: Never Used      Tobacco comment: decreasing    Vaping Use      Vaping Use: Never used    Alcohol use: No    Drug use: No      ROS     GENERAL HEALTH: o 11/12/2020 62.5  3.6 - 114.0 IU/mL Final   • Vitamin D, 25OH, Total 11/12/2020 26.7* 30.0 - 100.0 ng/mL Final    Literature Recommendations for 25(OH)D levels are:  Range           Vitamin D Status   <20    ng/mL      Deficiency   20-<30 ng/mL      Insuffi Interpretation 11/12/2020 See Note   Final    Comment: INTERPRETIVE INFORMATION: Streptococcus pneumoniae Antibodies, IgG    A pre- and post-vaccination comparison is required to adequately   assess the humoral immune response to Prevnar 7 (P7), Prevnar 13 Evaluation of Humoral   Immune Function. Clin Vaccine Immunol. 2015;22(2):148-152. Test developed and characteristics                            determined by [facility].  See Compliance Statement B: "[email or web address]  Performed By: Douglas Fofana considered a      non-responder.     2. If the post-vaccination concentration is greater than     or equal to 1.0 IU/mL, a patient with a ratio of less     than 1.5 is a non-responder, a ratio of 1.5 to less      than 3.0, a weak responder, and a ratio of 3 MD   • C1Q Complement Component 11/12/2020 103* 109 - 242 ug/mL Final    Performed By: Zoey Ordoñez  [facility], [address] [facility]  : Judith Lugo.  Ravin Zaragoza MD   • Complement C4 11/12/2020 22.6  10.0 - 40.0 mg/dL Final   • Com • Neutrophil % 11/12/2020 64.2  % Final   • Lymphocyte % 11/12/2020 26.1  % Final   • Monocyte % 11/12/2020 6.9  % Final   • Eosinophil % 11/12/2020 2.1  % Final   • Basophil % 11/12/2020 0.3  % Final   • Immature Granulocyte % 11/12/2020 0.4  % Final

## 2021-05-19 PROCEDURE — 88304 TISSUE EXAM BY PATHOLOGIST: CPT | Performed by: SURGERY

## 2021-05-26 ENCOUNTER — MED REC SCAN ONLY (OUTPATIENT)
Dept: SURGERY | Facility: CLINIC | Age: 41
End: 2021-05-26

## 2021-06-16 RX ORDER — CETIRIZINE HYDROCHLORIDE 10 MG/1
10 TABLET ORAL DAILY
Qty: 30 TABLET | Refills: 0 | Status: SHIPPED | OUTPATIENT
Start: 2021-06-16 | End: 2021-08-18

## 2021-06-16 NOTE — TELEPHONE ENCOUNTER
GENERIC ZYRTEC        [facility] [place]     SHE IS LEAVING FOR 2 WEEKS IN THE MORNING
Last OV w/Dr Bess Messing 4/10/21  Last refill 11/12/20 #30
Improved.

## 2021-08-18 RX ORDER — CETIRIZINE HYDROCHLORIDE 10 MG/1
10 TABLET ORAL DAILY
Qty: 30 TABLET | Refills: 0 | Status: SHIPPED | OUTPATIENT
Start: 2021-08-18 | End: 2021-10-13

## 2021-10-13 RX ORDER — ALBUTEROL SULFATE 90 UG/1
2 AEROSOL, METERED RESPIRATORY (INHALATION) EVERY 6 HOURS PRN
Qty: 1 EACH | Refills: 0 | Status: SHIPPED | OUTPATIENT
Start: 2021-10-13 | End: 2021-11-05

## 2021-10-13 RX ORDER — MONTELUKAST SODIUM 10 MG/1
10 TABLET ORAL NIGHTLY
Qty: 90 TABLET | Refills: 0 | Status: SHIPPED | OUTPATIENT
Start: 2021-10-13 | End: 2022-01-11

## 2021-10-13 RX ORDER — CETIRIZINE HYDROCHLORIDE 10 MG/1
10 TABLET ORAL DAILY
Qty: 30 TABLET | Refills: 0 | Status: SHIPPED | OUTPATIENT
Start: 2021-10-13 | End: 2022-01-03

## 2021-10-13 NOTE — TELEPHONE ENCOUNTER
Last OV w/Dr Mati Barclay 4/10/21  Last refill of albuterol 10/19/20 #1 w/3 refills   Last refill of Cetirizine 8/18/21 #30  Last refill of Montelukast 4/10/21 #90  No future visits scheduled.

## 2021-10-13 NOTE — TELEPHONE ENCOUNTER
cetirizine 10 MG Oral Tab, Montelukast Sodium 10 MG Oral Tab and Albuterol Sulfate  (90 Base) MCG/ACT Inhalation Aero Soln   [facility] [place]

## 2021-11-05 NOTE — TELEPHONE ENCOUNTER
Requested Prescriptions     Pending Prescriptions Disp Refills   • ALBUTEROL 108 (90 Base) MCG/ACT Inhalation Aero Soln [Pharmacy Med Name: ALBUTEROL HFA INH (200 PUFFS)8.5GM] 8.5 g 0     Sig: INHALE 2 PUFFS INTO THE LUNGS EVERY 6 HOURS AS NEEDED FOR [facility]

## 2021-11-06 RX ORDER — ALBUTEROL SULFATE 90 UG/1
AEROSOL, METERED RESPIRATORY (INHALATION)
Qty: 8.5 G | Refills: 0 | Status: SHIPPED | OUTPATIENT
Start: 2021-11-06

## 2022-01-03 RX ORDER — CETIRIZINE HYDROCHLORIDE 10 MG/1
10 TABLET ORAL DAILY
Qty: 30 TABLET | Refills: 0 | Status: SHIPPED | OUTPATIENT
Start: 2022-01-03 | End: 2022-02-02

## 2022-01-03 NOTE — TELEPHONE ENCOUNTER
Last refill - 10/13/21 - #30   Last office visit - 4/10/21  Contacted patient. Scheduled physical and fasting labs.    Future Appointments   Date Time Provider Faizan Morales   1/18/2022  9:00 AM Ericka Aguayo MD [facility] [place]

## 2022-01-18 ENCOUNTER — OFFICE VISIT (OUTPATIENT)
Dept: FAMILY MEDICINE CLINIC | Facility: CLINIC | Age: 42
End: 2022-01-18
Payer: COMMERCIAL

## 2022-01-18 VITALS
BODY MASS INDEX: 38.77 KG/M2 | SYSTOLIC BLOOD PRESSURE: 118 MMHG | HEART RATE: 68 BPM | DIASTOLIC BLOOD PRESSURE: 64 MMHG | WEIGHT: 227.13 LBS | TEMPERATURE: 99 F | OXYGEN SATURATION: 97 % | RESPIRATION RATE: 16 BRPM | HEIGHT: 64 IN

## 2022-01-18 DIAGNOSIS — R53.83 FATIGUE, UNSPECIFIED TYPE: ICD-10-CM

## 2022-01-18 DIAGNOSIS — Z00.00 WELL ADULT EXAM: Primary | ICD-10-CM

## 2022-01-18 LAB
ALBUMIN SERPL-MCNC: 4 G/DL (ref 3.4–5)
ALBUMIN/GLOB SERPL: 1.5 {RATIO} (ref 1–2)
ALP LIVER SERPL-CCNC: 46 U/L
ALT SERPL-CCNC: 18 U/L
ANION GAP SERPL CALC-SCNC: 4 MMOL/L (ref 0–18)
AST SERPL-CCNC: 12 U/L (ref 15–37)
BASOPHILS # BLD AUTO: 0.02 X10(3) UL (ref 0–0.2)
BASOPHILS NFR BLD AUTO: 0.4 %
BILIRUB SERPL-MCNC: 0.6 MG/DL (ref 0.1–2)
BUN BLD-MCNC: 14 MG/DL (ref 7–18)
CALCIUM BLD-MCNC: 8.7 MG/DL (ref 8.5–10.1)
CHLORIDE SERPL-SCNC: 108 MMOL/L (ref 98–112)
CHOLEST SERPL-MCNC: 223 MG/DL (ref ?–200)
CO2 SERPL-SCNC: 28 MMOL/L (ref 21–32)
CREAT BLD-MCNC: 0.73 MG/DL
EOSINOPHIL # BLD AUTO: 0.2 X10(3) UL (ref 0–0.7)
EOSINOPHIL NFR BLD AUTO: 3.9 %
ERYTHROCYTE [DISTWIDTH] IN BLOOD BY AUTOMATED COUNT: 13.2 %
FASTING PATIENT LIPID ANSWER: YES
FASTING STATUS PATIENT QL REPORTED: YES
GLOBULIN PLAS-MCNC: 2.6 G/DL (ref 2.8–4.4)
GLUCOSE BLD-MCNC: 103 MG/DL (ref 70–99)
HCT VFR BLD AUTO: 39.3 %
HDLC SERPL-MCNC: 50 MG/DL (ref 40–59)
HGB BLD-MCNC: 13.3 G/DL
IMM GRANULOCYTES # BLD AUTO: 0.01 X10(3) UL (ref 0–1)
IMM GRANULOCYTES NFR BLD: 0.2 %
LDLC SERPL CALC-MCNC: 158 MG/DL (ref ?–100)
LYMPHOCYTES # BLD AUTO: 1.42 X10(3) UL (ref 1–4)
LYMPHOCYTES NFR BLD AUTO: 27.7 %
MCH RBC QN AUTO: 30.9 PG (ref 26–34)
MCHC RBC AUTO-ENTMCNC: 33.8 G/DL (ref 31–37)
MCV RBC AUTO: 91.2 FL
MONOCYTES # BLD AUTO: 0.44 X10(3) UL (ref 0.1–1)
MONOCYTES NFR BLD AUTO: 8.6 %
NEUTROPHILS # BLD AUTO: 3.03 X10 (3) UL (ref 1.5–7.7)
NEUTROPHILS # BLD AUTO: 3.03 X10(3) UL (ref 1.5–7.7)
NEUTROPHILS NFR BLD AUTO: 59.2 %
NONHDLC SERPL-MCNC: 173 MG/DL (ref ?–130)
OSMOLALITY SERPL CALC.SUM OF ELEC: 291 MOSM/KG (ref 275–295)
PLATELET # BLD AUTO: 239 10(3)UL (ref 150–450)
POTASSIUM SERPL-SCNC: 4 MMOL/L (ref 3.5–5.1)
PROT SERPL-MCNC: 6.6 G/DL (ref 6.4–8.2)
RBC # BLD AUTO: 4.31 X10(6)UL
SODIUM SERPL-SCNC: 140 MMOL/L (ref 136–145)
T4 FREE SERPL-MCNC: 0.8 NG/DL (ref 0.8–1.7)
TRIGL SERPL-MCNC: 85 MG/DL (ref 30–149)
TSI SER-ACNC: 0.67 MIU/ML (ref 0.36–3.74)
VLDLC SERPL CALC-MCNC: 16 MG/DL (ref 0–30)
WBC # BLD AUTO: 5.1 X10(3) UL (ref 4–11)

## 2022-01-18 PROCEDURE — 3008F BODY MASS INDEX DOCD: CPT | Performed by: INTERNAL MEDICINE

## 2022-01-18 PROCEDURE — 3078F DIAST BP <80 MM HG: CPT | Performed by: INTERNAL MEDICINE

## 2022-01-18 PROCEDURE — 88175 CYTOPATH C/V AUTO FLUID REDO: CPT | Performed by: INTERNAL MEDICINE

## 2022-01-18 PROCEDURE — 85025 COMPLETE CBC W/AUTO DIFF WBC: CPT | Performed by: INTERNAL MEDICINE

## 2022-01-18 PROCEDURE — 3074F SYST BP LT 130 MM HG: CPT | Performed by: INTERNAL MEDICINE

## 2022-01-18 PROCEDURE — 99395 PREV VISIT EST AGE 18-39: CPT | Performed by: INTERNAL MEDICINE

## 2022-01-18 PROCEDURE — 84443 ASSAY THYROID STIM HORMONE: CPT | Performed by: INTERNAL MEDICINE

## 2022-01-18 PROCEDURE — 80061 LIPID PANEL: CPT | Performed by: INTERNAL MEDICINE

## 2022-01-18 PROCEDURE — 80053 COMPREHEN METABOLIC PANEL: CPT | Performed by: INTERNAL MEDICINE

## 2022-01-18 PROCEDURE — 84439 ASSAY OF FREE THYROXINE: CPT | Performed by: INTERNAL MEDICINE

## 2022-01-18 NOTE — PROGRESS NOTES
HPI:   Elizabeth Segundo is a 46year old female who presents for a complete physical exam. Symptoms: denies discharge, itching, burning or dysuria, periods are regular. Patient complains of weight gain. NOT TOO MUCH SUGAR IN DIET!        There is no immunizati Problem Relation Age of Onset   • Hypertension Father    • Stroke Father    • Heart Disorder Father 48        CAD   • Other (Other) Father         sinha anuerysm   • Hypertension Mother    • Other (Other) Mother         alcohol      Social History:   Soc three,cranial nerves are intact,motor and sensory are grossly intact    ASSESSMENT AND PLAN:   Flip Luo is a 46year old female who presents for a complete physical exam.  Pap and pelvic done. Order put in for mammogram and dexascan.  Self breast exam

## 2022-01-20 RX ORDER — SIMVASTATIN 20 MG
20 TABLET ORAL NIGHTLY
Qty: 90 TABLET | Refills: 0 | Status: SHIPPED | OUTPATIENT
Start: 2022-01-20 | End: 2022-04-20

## 2022-01-27 LAB — LAST PAP RESULT: NORMAL

## 2022-02-22 RX ORDER — CETIRIZINE HYDROCHLORIDE 10 MG/1
10 TABLET ORAL DAILY
Qty: 30 TABLET | Refills: 0 | Status: SHIPPED | OUTPATIENT
Start: 2022-02-22 | End: 2022-03-24

## 2022-03-10 ENCOUNTER — TELEPHONE (OUTPATIENT)
Dept: FAMILY MEDICINE CLINIC | Facility: CLINIC | Age: 42
End: 2022-03-10

## 2022-03-10 NOTE — TELEPHONE ENCOUNTER
BENNY MARSH ORDER FAXED TO [id number] [facility], CALL HER WHEN THIS HAS BEEN DONE SO SHE CAN CALL & SCHEDULE

## 2022-03-16 ENCOUNTER — TELEPHONE (OUTPATIENT)
Dept: FAMILY MEDICINE CLINIC | Facility: CLINIC | Age: 42
End: 2022-03-16

## 2022-03-16 RX ORDER — PREDNISONE 20 MG/1
TABLET ORAL
Qty: 13 TABLET | Refills: 0 | Status: SHIPPED | OUTPATIENT
Start: 2022-03-16 | End: 2022-03-24

## 2022-03-16 NOTE — TELEPHONE ENCOUNTER
Patient said that she is feeling slighlty worse than she did when she was here. She said she has a headache, congestion, ear popping, chest heaviness and a cough that keeps her up at night. She said that she is taking Singulair, Zyrtec, doing neb treatments 3x daily, using Breo. She has not tried Mucinex or a decongestant. She is asking if a steroid is the next step.

## 2022-03-16 NOTE — TELEPHONE ENCOUNTER
PATIENT WAS SEEN ON SATURDAY WITH SON AND  GAVE MEDICATION FOR BOTH, IT IS NOT HELPING.   IS THERE SOMETHING ELSE SHE CAN GET/  [facility]

## 2022-03-28 ENCOUNTER — TELEPHONE (OUTPATIENT)
Dept: FAMILY MEDICINE CLINIC | Facility: CLINIC | Age: 42
End: 2022-03-28

## 2022-03-28 ENCOUNTER — OFFICE VISIT (OUTPATIENT)
Dept: FAMILY MEDICINE CLINIC | Facility: CLINIC | Age: 42
End: 2022-03-28
Payer: COMMERCIAL

## 2022-03-28 VITALS
OXYGEN SATURATION: 98 % | DIASTOLIC BLOOD PRESSURE: 74 MMHG | WEIGHT: 229 LBS | HEART RATE: 69 BPM | HEIGHT: 64 IN | BODY MASS INDEX: 39.09 KG/M2 | RESPIRATION RATE: 18 BRPM | SYSTOLIC BLOOD PRESSURE: 118 MMHG | TEMPERATURE: 98 F

## 2022-03-28 DIAGNOSIS — K04.6 PERIAPICAL ABSCESS OF TOOTH WITH FISTULA: Primary | ICD-10-CM

## 2022-03-28 RX ORDER — HYDROCODONE BITARTRATE AND ACETAMINOPHEN 7.5; 325 MG/1; MG/1
TABLET ORAL
COMMUNITY
Start: 2022-03-27

## 2022-03-28 RX ORDER — AMOXICILLIN 500 MG/1
TABLET, FILM COATED ORAL
COMMUNITY
Start: 2022-03-25

## 2022-03-28 NOTE — TELEPHONE ENCOUNTER
Madina is calling she had a emergency tooth extracted yesterday and now her face is swollen we did make an appt for 10:15 but the dentist said that if she did not get better that she may need to go to the hospital and get IV antibiotics. She is on oral antibiotics but it doesn't seem to be helping.   Please call

## 2022-04-05 ENCOUNTER — HOSPITAL ENCOUNTER (OUTPATIENT)
Dept: MAMMOGRAPHY | Age: 42
Discharge: HOME | End: 2022-04-05
Attending: INTERNAL MEDICINE
Payer: COMMERCIAL

## 2022-04-05 ENCOUNTER — TELEPHONE (OUTPATIENT)
Dept: FAMILY MEDICINE CLINIC | Facility: CLINIC | Age: 42
End: 2022-04-05

## 2022-04-05 DIAGNOSIS — Z00.00 WELL ADULT EXAM: ICD-10-CM

## 2022-04-05 PROCEDURE — 77067 SCR MAMMO BI INCL CAD: CPT | Performed by: INTERNAL MEDICINE

## 2022-04-05 NOTE — TELEPHONE ENCOUNTER
Left message for pt to call back, mammogram results viewed on my chart, just checking to see if she had any questions about doctors note.

## 2022-04-26 RX ORDER — MONTELUKAST SODIUM 10 MG/1
10 TABLET ORAL NIGHTLY
Qty: 90 TABLET | Refills: 0 | Status: SHIPPED | OUTPATIENT
Start: 2022-04-26 | End: 2022-07-25

## 2022-04-26 RX ORDER — CETIRIZINE HYDROCHLORIDE 10 MG/1
10 TABLET ORAL DAILY
Qty: 90 TABLET | Refills: 0 | Status: SHIPPED | OUTPATIENT
Start: 2022-04-26 | End: 2022-07-25

## 2022-11-17 RX ORDER — ALBUTEROL SULFATE 90 UG/1
2 AEROSOL, METERED RESPIRATORY (INHALATION) EVERY 6 HOURS PRN
Qty: 8.5 G | Refills: 0 | Status: SHIPPED | OUTPATIENT
Start: 2022-11-17

## 2022-11-17 NOTE — TELEPHONE ENCOUNTER
Last Ov w/Dr Alix Ortiz 3/28/22  Last refill 11/6/21  No future appointments scheduled, due for a physical 1/18/23

## 2023-03-13 RX ORDER — CETIRIZINE HYDROCHLORIDE 10 MG/1
10 TABLET ORAL DAILY
Qty: 90 TABLET | Refills: 0 | Status: SHIPPED | OUTPATIENT
Start: 2023-03-13 | End: 2023-06-11

## 2023-03-13 NOTE — TELEPHONE ENCOUNTER
Last OV w/Dr Elisha Sanders 3/28/22  Last refill 4/26/22 #90  No future appointments scheduled. Patient advised she is due for a physical. She will call back to schedule.

## 2023-04-18 ENCOUNTER — HOSPITAL ENCOUNTER (OUTPATIENT)
Dept: GENERAL RADIOLOGY | Age: 43
Discharge: HOME | End: 2023-04-18
Payer: COMMERCIAL

## 2023-04-18 ENCOUNTER — OFFICE VISIT (OUTPATIENT)
Dept: FAMILY MEDICINE CLINIC | Facility: CLINIC | Age: 43
End: 2023-04-18
Payer: COMMERCIAL

## 2023-04-18 VITALS
TEMPERATURE: 99 F | WEIGHT: 237.38 LBS | BODY MASS INDEX: 40.52 KG/M2 | OXYGEN SATURATION: 98 % | HEART RATE: 79 BPM | DIASTOLIC BLOOD PRESSURE: 69 MMHG | SYSTOLIC BLOOD PRESSURE: 115 MMHG | RESPIRATION RATE: 19 BRPM | HEIGHT: 64 IN

## 2023-04-18 DIAGNOSIS — M79.642 LEFT HAND PAIN: ICD-10-CM
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
## 2023-04-18 DIAGNOSIS — M54.2 NECK PAIN: ICD-10-CM

## 2023-04-18 PROCEDURE — 99214 OFFICE O/P EST MOD 30 MIN: CPT

## 2023-04-18 PROCEDURE — 3078F DIAST BP <80 MM HG: CPT

## 2023-04-18 PROCEDURE — 73130 X-RAY EXAM OF HAND: CPT

## 2023-04-18 PROCEDURE — 3074F SYST BP LT 130 MM HG: CPT

## 2023-04-18 PROCEDURE — 3008F BODY MASS INDEX DOCD: CPT

## 2023-04-18 RX ORDER — CYCLOBENZAPRINE HCL 10 MG
10 TABLET ORAL 3 TIMES DAILY PRN
Qty: 30 TABLET | Refills: 0 | Status: SHIPPED | OUTPATIENT
Start: 2023-04-18 | End: 2023-04-28

## 2023-04-18 RX ORDER — MELOXICAM 15 MG/1
15 TABLET ORAL DAILY
Qty: 14 TABLET | Refills: 0 | Status: SHIPPED | OUTPATIENT
Start: 2023-04-18 | End: 2023-05-02

## 2023-04-19 ENCOUNTER — TELEPHONE (OUTPATIENT)
Dept: FAMILY MEDICINE CLINIC | Facility: CLINIC | Age: 43
End: 2023-04-19

## 2023-07-13 NOTE — TELEPHONE ENCOUNTER
Patient advised that Dr Lissy De Leon is not on the list. She will call and see who she can get in with and let us know. She said that she wants to see Dr Moya Post. Referral placed. LM for patient not to go to any appointments until Covid test comes back.
Patient said that Dr Gucci Groves is booked until December, Dr Payam Martin is with her group she wants to know if she can go to him. Advised he is not on the [facility] provider list, will check with the referral dept Tuesday.
Pt. Asking for ref. For allergist, Dr. Lisbeth Dexter and she has questions re: ref. Dr. Fiona Bentley. Please call pt.
Referral placed for Dr Acosta Living pulmonologist per patient's request.
symmetrical

## 2023-08-11 RX ORDER — CETIRIZINE HYDROCHLORIDE 10 MG/1
10 TABLET ORAL DAILY
Qty: 90 TABLET | Refills: 0 | Status: SHIPPED | OUTPATIENT
Start: 2023-08-11 | End: 2023-11-09

## 2023-08-11 NOTE — TELEPHONE ENCOUNTER
Last office visit: 4/18/23  Last refill: 3/13/23  Future Appointments   Date Time Provider Faizan Morales   9/5/2023  9:30 AM Kenji Lu MD [facility] [place]     cetirizine 10 MG Oral Tab          Sig: Take 1 tablet (10 mg total) by mouth daily.     Disp: 90 tablet    Refills: 0    Start: 8/11/2023 - 11/9/2023    Class: Normal    Non-formulary    Last ordered: 5 months ago by Jose R Dominguez MD     Allergy Medication Protocol [id number]/11/2023 01:21 PM    Appointment in the past 12 or next 3 months      To be filled at: [address] ([address]), [phone], [phone]

## 2023-09-05 ENCOUNTER — LABORATORY ENCOUNTER (OUTPATIENT)
Dept: LAB | Age: 43
End: 2023-09-05
Attending: INTERNAL MEDICINE
Payer: COMMERCIAL

## 2023-09-05 ENCOUNTER — OFFICE VISIT (OUTPATIENT)
Dept: FAMILY MEDICINE CLINIC | Facility: CLINIC | Age: 43
End: 2023-09-05
Payer: COMMERCIAL

## 2023-09-05 VITALS
BODY MASS INDEX: 39.99 KG/M2 | HEART RATE: 77 BPM | WEIGHT: 234.25 LBS | OXYGEN SATURATION: 96 % | HEIGHT: 64 IN | TEMPERATURE: 98 F | SYSTOLIC BLOOD PRESSURE: 112 MMHG | RESPIRATION RATE: 16 BRPM | DIASTOLIC BLOOD PRESSURE: 64 MMHG

## 2023-09-05 DIAGNOSIS — E66.9 OBESITY (BMI 35.0-39.9 WITHOUT COMORBIDITY): ICD-10-CM

## 2023-09-05 DIAGNOSIS — Z00.00 WELL ADULT EXAM: ICD-10-CM

## 2023-09-05 DIAGNOSIS — F90.9 ATTENTION DEFICIT HYPERACTIVITY DISORDER (ADHD), UNSPECIFIED ADHD TYPE: ICD-10-CM

## 2023-09-05 DIAGNOSIS — R53.83 FATIGUE, UNSPECIFIED TYPE: ICD-10-CM

## 2023-09-05 DIAGNOSIS — Z00.00 WELL ADULT EXAM: Primary | ICD-10-CM

## 2023-09-05 DIAGNOSIS — Z12.31 VISIT FOR SCREENING MAMMOGRAM: ICD-10-CM

## 2023-09-05 LAB
ALBUMIN SERPL-MCNC: 4 G/DL (ref 3.4–5)
ALBUMIN/GLOB SERPL: 1.4 {RATIO} (ref 1–2)
ALP LIVER SERPL-CCNC: 42 U/L
ALT SERPL-CCNC: 21 U/L
ANION GAP SERPL CALC-SCNC: 5 MMOL/L (ref 0–18)
AST SERPL-CCNC: 17 U/L (ref 15–37)
BASOPHILS # BLD AUTO: 0.02 X10(3) UL (ref 0–0.2)
BASOPHILS NFR BLD AUTO: 0.4 %
BILIRUB SERPL-MCNC: 0.6 MG/DL (ref 0.1–2)
BUN BLD-MCNC: 15 MG/DL (ref 7–18)
CALCIUM BLD-MCNC: 8.8 MG/DL (ref 8.5–10.1)
CHLORIDE SERPL-SCNC: 107 MMOL/L (ref 98–112)
CHOLEST SERPL-MCNC: 198 MG/DL (ref ?–200)
CO2 SERPL-SCNC: 27 MMOL/L (ref 21–32)
CREAT BLD-MCNC: 0.88 MG/DL
EGFRCR SERPLBLD CKD-EPI 2021: 85 ML/MIN/1.73M2 (ref 60–?)
EOSINOPHIL # BLD AUTO: 0.23 X10(3) UL (ref 0–0.7)
EOSINOPHIL NFR BLD AUTO: 4.7 %
ERYTHROCYTE [DISTWIDTH] IN BLOOD BY AUTOMATED COUNT: 12.7 %
EST. AVERAGE GLUCOSE BLD GHB EST-MCNC: 114 MG/DL (ref 68–126)
FASTING PATIENT LIPID ANSWER: YES
FASTING STATUS PATIENT QL REPORTED: YES
GLOBULIN PLAS-MCNC: 2.9 G/DL (ref 2.8–4.4)
GLUCOSE BLD-MCNC: 98 MG/DL (ref 70–99)
HBA1C MFR BLD: 5.6 % (ref ?–5.7)
HCT VFR BLD AUTO: 38 %
HDLC SERPL-MCNC: 44 MG/DL (ref 40–59)
HGB BLD-MCNC: 12.9 G/DL
IMM GRANULOCYTES # BLD AUTO: 0.01 X10(3) UL (ref 0–1)
IMM GRANULOCYTES NFR BLD: 0.2 %
LDLC SERPL CALC-MCNC: 135 MG/DL (ref ?–100)
LYMPHOCYTES # BLD AUTO: 1.69 X10(3) UL (ref 1–4)
LYMPHOCYTES NFR BLD AUTO: 34.2 %
MCH RBC QN AUTO: 30.8 PG (ref 26–34)
MCHC RBC AUTO-ENTMCNC: 33.9 G/DL (ref 31–37)
MCV RBC AUTO: 90.7 FL
MONOCYTES # BLD AUTO: 0.5 X10(3) UL (ref 0.1–1)
MONOCYTES NFR BLD AUTO: 10.1 %
NEUTROPHILS # BLD AUTO: 2.49 X10 (3) UL (ref 1.5–7.7)
NEUTROPHILS # BLD AUTO: 2.49 X10(3) UL (ref 1.5–7.7)
NEUTROPHILS NFR BLD AUTO: 50.4 %
NONHDLC SERPL-MCNC: 154 MG/DL (ref ?–130)
OSMOLALITY SERPL CALC.SUM OF ELEC: 289 MOSM/KG (ref 275–295)
PLATELET # BLD AUTO: 262 10(3)UL (ref 150–450)
POTASSIUM SERPL-SCNC: 3.9 MMOL/L (ref 3.5–5.1)
PROT SERPL-MCNC: 6.9 G/DL (ref 6.4–8.2)
RBC # BLD AUTO: 4.19 X10(6)UL
SODIUM SERPL-SCNC: 139 MMOL/L (ref 136–145)
T4 FREE SERPL-MCNC: 0.9 NG/DL (ref 0.8–1.7)
TRIGL SERPL-MCNC: 106 MG/DL (ref 30–149)
TSI SER-ACNC: 1.56 MIU/ML (ref 0.36–3.74)
VLDLC SERPL CALC-MCNC: 19 MG/DL (ref 0–30)
WBC # BLD AUTO: 4.9 X10(3) UL (ref 4–11)

## 2023-09-05 PROCEDURE — 3008F BODY MASS INDEX DOCD: CPT | Performed by: INTERNAL MEDICINE

## 2023-09-05 PROCEDURE — 83036 HEMOGLOBIN GLYCOSYLATED A1C: CPT

## 2023-09-05 PROCEDURE — 99396 PREV VISIT EST AGE 40-64: CPT | Performed by: INTERNAL MEDICINE

## 2023-09-05 PROCEDURE — 84443 ASSAY THYROID STIM HORMONE: CPT

## 2023-09-05 PROCEDURE — 84439 ASSAY OF FREE THYROXINE: CPT

## 2023-09-05 PROCEDURE — 85025 COMPLETE CBC W/AUTO DIFF WBC: CPT

## 2023-09-05 PROCEDURE — 88175 CYTOPATH C/V AUTO FLUID REDO: CPT | Performed by: INTERNAL MEDICINE

## 2023-09-05 PROCEDURE — 36415 COLL VENOUS BLD VENIPUNCTURE: CPT

## 2023-09-05 PROCEDURE — 87624 HPV HI-RISK TYP POOLED RSLT: CPT | Performed by: INTERNAL MEDICINE

## 2023-09-05 PROCEDURE — 3078F DIAST BP <80 MM HG: CPT | Performed by: INTERNAL MEDICINE

## 2023-09-05 PROCEDURE — 3074F SYST BP LT 130 MM HG: CPT | Performed by: INTERNAL MEDICINE

## 2023-09-05 PROCEDURE — 80053 COMPREHEN METABOLIC PANEL: CPT

## 2023-09-05 PROCEDURE — 80061 LIPID PANEL: CPT

## 2023-09-05 RX ORDER — DEXTROAMPHETAMINE SACCHARATE, AMPHETAMINE ASPARTATE, DEXTROAMPHETAMINE SULFATE AND AMPHETAMINE SULFATE 5; 5; 5; 5 MG/1; MG/1; MG/1; MG/1
20 TABLET ORAL 2 TIMES DAILY
Qty: 60 TABLET | Refills: 0 | Status: SHIPPED | OUTPATIENT
Start: 2023-10-06 | End: 2023-11-05

## 2023-09-05 RX ORDER — DEXTROAMPHETAMINE SACCHARATE, AMPHETAMINE ASPARTATE, DEXTROAMPHETAMINE SULFATE AND AMPHETAMINE SULFATE 5; 5; 5; 5 MG/1; MG/1; MG/1; MG/1
20 TABLET ORAL 2 TIMES DAILY
Qty: 60 TABLET | Refills: 0 | Status: SHIPPED | OUTPATIENT
Start: 2023-09-05 | End: 2023-10-05

## 2023-09-05 RX ORDER — DEXTROAMPHETAMINE SACCHARATE, AMPHETAMINE ASPARTATE, DEXTROAMPHETAMINE SULFATE AND AMPHETAMINE SULFATE 5; 5; 5; 5 MG/1; MG/1; MG/1; MG/1
20 TABLET ORAL 2 TIMES DAILY
Qty: 60 TABLET | Refills: 0 | Status: SHIPPED | OUTPATIENT
Start: 2023-11-06 | End: 2023-12-06

## 2023-09-11 LAB — HPV I/H RISK 1 DNA SPEC QL NAA+PROBE: NEGATIVE

## 2023-09-26 RX ORDER — ALBUTEROL SULFATE 90 UG/1
2 AEROSOL, METERED RESPIRATORY (INHALATION) EVERY 6 HOURS PRN
Qty: 8.5 G | Refills: 0 | Status: SHIPPED | OUTPATIENT
Start: 2023-09-26

## 2023-09-26 NOTE — TELEPHONE ENCOUNTER
PT NEEDS REFILL ON albuterol 108 (90 Base) MCG/ACT Inhalation Aero Soln.  [facility]

## 2023-09-28 RX ORDER — ALBUTEROL SULFATE 2.5 MG/3ML
2.5 SOLUTION RESPIRATORY (INHALATION) EVERY 6 HOURS PRN
Qty: 50 EACH | Refills: 3 | Status: SHIPPED | OUTPATIENT
Start: 2023-09-28 | End: 2023-10-28

## 2023-09-28 NOTE — TELEPHONE ENCOUNTER
albuterol 108 (90 Base) MCG/ACT Inhalation Aero [facility]

## 2023-09-28 NOTE — TELEPHONE ENCOUNTER
KENNETH for patient to CB, the inhaler was called in on 9/26/23. Does she need the Albuterol solution?

## 2023-10-10 ENCOUNTER — HOSPITAL ENCOUNTER (OUTPATIENT)
Dept: MAMMOGRAPHY | Age: 43
Discharge: HOME | End: 2023-10-10
Attending: INTERNAL MEDICINE
Payer: COMMERCIAL

## 2023-10-10 DIAGNOSIS — Z12.31 VISIT FOR SCREENING MAMMOGRAM: ICD-10-CM

## 2023-10-10 PROCEDURE — 77067 SCR MAMMO BI INCL CAD: CPT | Performed by: INTERNAL MEDICINE

## 2023-10-10 PROCEDURE — 77063 BREAST TOMOSYNTHESIS BI: CPT | Performed by: INTERNAL MEDICINE

## 2023-10-12 ENCOUNTER — TELEPHONE (OUTPATIENT)
Dept: FAMILY MEDICINE CLINIC | Facility: CLINIC | Age: 43
End: 2023-10-12

## 2023-10-12 NOTE — TELEPHONE ENCOUNTER
Pt. Has questions on her mammogram results and additional testing they are asking for her to schedule.

## 2023-10-12 NOTE — TELEPHONE ENCOUNTER
I looked over the mammogram and it said she has some asymmetry and dense breast which is why they want additional imaged. Due to the breast being dense they want to do additional images to make sure there are no masses being missed.

## 2023-10-17 ENCOUNTER — TELEPHONE (OUTPATIENT)
Dept: FAMILY MEDICINE CLINIC | Facility: CLINIC | Age: 43
End: 2023-10-17

## 2023-10-17 NOTE — TELEPHONE ENCOUNTER
Contacted pt. States she had a mammogram on 10/10/23 but was told she needed additional views. Questioning when someone would contact her regarding to schedule additional views.   Message left at [facility] radiology to call this office back re: order

## 2023-10-17 NOTE — TELEPHONE ENCOUNTER
Received call back from [facility] - pt was left a message to call and schedule additional views. Pt did not call back. Pt to call [phone]. Order had already been placed. Called pt - given phone # to call and schedule. Pt verbalized understanding.

## 2023-10-17 NOTE — TELEPHONE ENCOUNTER
Message sent to [facility] phone # given:  [phone]    Pt notified

## 2023-10-19 RX ORDER — ALBUTEROL SULFATE 90 UG/1
2 AEROSOL, METERED RESPIRATORY (INHALATION) EVERY 6 HOURS PRN
Qty: 8.5 G | Refills: 0 | Status: SHIPPED | OUTPATIENT
Start: 2023-10-19

## 2023-10-19 NOTE — TELEPHONE ENCOUNTER
Last refill: 09/26/23  Qty: 8.5 g  W/ 0 refills  Last ov: 09/05/23    Requested Prescriptions     Pending Prescriptions Disp Refills    ALBUTEROL 108 (90 Base) MCG/ACT Inhalation Aero Soln [Pharmacy Med Name: ALBUTEROL HFA INH (200 PUFFS) 8.5GM] 8.5 g 0     Sig: INHALE 2 PUFFS INTO THE LUNGS EVERY 6 HOURS AS NEEDED FOR WHEEZING     Future Appointments   Date Time Provider Faizan Morales   10/27/2023 10:40 AM [facility]

## 2023-10-24 DIAGNOSIS — J30.2 SEASONAL ALLERGIES: Primary | ICD-10-CM

## 2023-10-24 RX ORDER — FLUTICASONE FUROATE AND VILANTEROL 200; 25 UG/1; UG/1
1 POWDER RESPIRATORY (INHALATION) DAILY
Qty: 1 EACH | Refills: 3 | Status: SHIPPED | OUTPATIENT
Start: 2023-10-24

## 2023-10-24 RX ORDER — MONTELUKAST SODIUM 10 MG/1
10 TABLET ORAL NIGHTLY
Qty: 90 TABLET | Refills: 3 | Status: SHIPPED | OUTPATIENT
Start: 2023-10-24 | End: 2024-10-18

## 2023-10-24 NOTE — TELEPHONE ENCOUNTER
LOV 9/5/23 with Dr. Georgette Walters - well adult exam    Both meds were not listed as pt taking - had been previously refilled in 2022. Spoke with patient - had taken medication in the past and \"it helped with seasonal allergies\"    Has not taken it for the past year. [id number] [address] Flores for refills? Dx seasonal allergies?

## 2023-10-24 NOTE — TELEPHONE ENCOUNTER
PT NEEDS REFILL ON Montelukast Sodium 10 MG Oral Tab AND BREO ELLIPTA 200-25 MCG/INH Inhalation Aerosol Powder, Breath Activated. [facility].

## 2023-10-25 ENCOUNTER — OFFICE VISIT (OUTPATIENT)
Dept: FAMILY MEDICINE CLINIC | Facility: CLINIC | Age: 43
End: 2023-10-25

## 2023-10-25 VITALS
TEMPERATURE: 97 F | BODY MASS INDEX: 40.12 KG/M2 | DIASTOLIC BLOOD PRESSURE: 62 MMHG | RESPIRATION RATE: 12 BRPM | WEIGHT: 235 LBS | HEART RATE: 74 BPM | HEIGHT: 64 IN | OXYGEN SATURATION: 97 % | SYSTOLIC BLOOD PRESSURE: 130 MMHG

## 2023-10-25 DIAGNOSIS — J01.00 SUBACUTE MAXILLARY SINUSITIS: Primary | ICD-10-CM

## 2023-10-25 PROCEDURE — 99213 OFFICE O/P EST LOW 20 MIN: CPT

## 2023-10-25 PROCEDURE — 3075F SYST BP GE 130 - 139MM HG: CPT

## 2023-10-25 PROCEDURE — 3008F BODY MASS INDEX DOCD: CPT

## 2023-10-25 PROCEDURE — 3078F DIAST BP <80 MM HG: CPT

## 2023-10-25 RX ORDER — PREDNISONE 20 MG/1
TABLET ORAL
Qty: 9 TABLET | Refills: 0 | Status: SHIPPED | OUTPATIENT
Start: 2023-10-25 | End: 2023-10-31

## 2023-10-25 RX ORDER — AMOXICILLIN AND CLAVULANATE POTASSIUM 875; 125 MG/1; MG/1
1 TABLET, FILM COATED ORAL 2 TIMES DAILY
Qty: 20 TABLET | Refills: 0 | Status: SHIPPED | OUTPATIENT
Start: 2023-10-25 | End: 2023-11-04

## 2023-10-27 ENCOUNTER — HOSPITAL ENCOUNTER (OUTPATIENT)
Dept: ULTRASOUND IMAGING | Age: 43
Discharge: HOME | End: 2023-10-27
Attending: INTERNAL MEDICINE

## 2023-10-27 ENCOUNTER — HOSPITAL ENCOUNTER (OUTPATIENT)
Dept: MAMMOGRAPHY | Age: 43
Discharge: HOME | End: 2023-10-27
Attending: INTERNAL MEDICINE

## 2023-10-27 DIAGNOSIS — R92.2 INCONCLUSIVE MAMMOGRAM: ICD-10-CM

## 2023-10-27 PROCEDURE — 77062 BREAST TOMOSYNTHESIS BI: CPT | Performed by: INTERNAL MEDICINE

## 2023-10-27 PROCEDURE — 77066 DX MAMMO INCL CAD BI: CPT | Performed by: INTERNAL MEDICINE

## 2023-10-27 PROCEDURE — 76642 ULTRASOUND BREAST LIMITED: CPT | Performed by: INTERNAL MEDICINE

## 2023-10-30 ENCOUNTER — TELEPHONE (OUTPATIENT)
Dept: FAMILY MEDICINE CLINIC | Facility: CLINIC | Age: 43
End: 2023-10-30

## 2023-10-30 NOTE — TELEPHONE ENCOUNTER
Patient states she is doing the [place] once daily. Advised she can increase to 1 puff twice daily. Follow up if not improving.  Navid SALVADOR

## 2023-10-30 NOTE — TELEPHONE ENCOUNTER
Patient said that she is still coughing a deep cough and she has pressure in her temples, eyes and above her cheeks. She finished Prednisone and is still taking the antibiotics. She is taking the [facility] but still having wheezing. She is bringing her sone in at 330pm tomorrow if Jose Lu wants to see her again.

## 2023-11-03 ENCOUNTER — LABORATORY ENCOUNTER (OUTPATIENT)
Dept: LAB | Age: 43
End: 2023-11-03
Payer: COMMERCIAL

## 2023-11-03 ENCOUNTER — OFFICE VISIT (OUTPATIENT)
Dept: FAMILY MEDICINE CLINIC | Facility: CLINIC | Age: 43
End: 2023-11-03
Payer: COMMERCIAL

## 2023-11-03 ENCOUNTER — TELEPHONE (OUTPATIENT)
Dept: FAMILY MEDICINE CLINIC | Facility: CLINIC | Age: 43
End: 2023-11-03

## 2023-11-03 VITALS
TEMPERATURE: 98 F | WEIGHT: 234.81 LBS | BODY MASS INDEX: 40 KG/M2 | DIASTOLIC BLOOD PRESSURE: 75 MMHG | HEART RATE: 79 BPM | SYSTOLIC BLOOD PRESSURE: 100 MMHG | OXYGEN SATURATION: 97 %

## 2023-11-03 DIAGNOSIS — J40 BRONCHITIS: ICD-10-CM

## 2023-11-03 DIAGNOSIS — R05.3 CHRONIC COUGH: Primary | ICD-10-CM

## 2023-11-03 LAB
BASOPHILS # BLD AUTO: 0.03 X10(3) UL (ref 0–0.2)
BASOPHILS NFR BLD AUTO: 0.4 %
EOSINOPHIL # BLD AUTO: 0.17 X10(3) UL (ref 0–0.7)
EOSINOPHIL NFR BLD AUTO: 2.3 %
ERYTHROCYTE [DISTWIDTH] IN BLOOD BY AUTOMATED COUNT: 13.3 %
HCT VFR BLD AUTO: 41.2 %
HGB BLD-MCNC: 14 G/DL
IMM GRANULOCYTES # BLD AUTO: 0.02 X10(3) UL (ref 0–1)
IMM GRANULOCYTES NFR BLD: 0.3 %
LYMPHOCYTES # BLD AUTO: 1.57 X10(3) UL (ref 1–4)
LYMPHOCYTES NFR BLD AUTO: 21.4 %
MCH RBC QN AUTO: 30.7 PG (ref 26–34)
MCHC RBC AUTO-ENTMCNC: 34 G/DL (ref 31–37)
MCV RBC AUTO: 90.4 FL
MONOCYTES # BLD AUTO: 0.54 X10(3) UL (ref 0.1–1)
MONOCYTES NFR BLD AUTO: 7.3 %
NEUTROPHILS # BLD AUTO: 5.02 X10 (3) UL (ref 1.5–7.7)
NEUTROPHILS # BLD AUTO: 5.02 X10(3) UL (ref 1.5–7.7)
NEUTROPHILS NFR BLD AUTO: 68.3 %
PLATELET # BLD AUTO: 290 10(3)UL (ref 150–450)
RBC # BLD AUTO: 4.56 X10(6)UL
WBC # BLD AUTO: 7.4 X10(3) UL (ref 4–11)

## 2023-11-03 PROCEDURE — 3078F DIAST BP <80 MM HG: CPT

## 2023-11-03 PROCEDURE — 99213 OFFICE O/P EST LOW 20 MIN: CPT

## 2023-11-03 PROCEDURE — 3074F SYST BP LT 130 MM HG: CPT

## 2023-11-03 NOTE — TELEPHONE ENCOUNTER
PT CALLING. SHOWED UP TO [facility] FOR CXR AND THEY ARE OUT OF NETWORK AND IS NOT ABLE TO HAVE IT DONE THERE. IS THIS SOMETHING SHE CAN GET DONE ON MONDAY?

## 2023-11-03 NOTE — TELEPHONE ENCOUNTER
Called Madina and set up her x-ray visit and let her know that once Brent Mcdaniel gets the results of that she will know if and or what medications to call in.

## 2023-11-06 ENCOUNTER — HOSPITAL ENCOUNTER (OUTPATIENT)
Dept: GENERAL RADIOLOGY | Age: 43
Discharge: HOME | End: 2023-11-06
Payer: COMMERCIAL

## 2023-11-06 DIAGNOSIS — R05.3 CHRONIC COUGH: ICD-10-CM

## 2023-11-06 PROCEDURE — 71046 X-RAY EXAM CHEST 2 VIEWS: CPT

## 2023-11-07 ENCOUNTER — TELEPHONE (OUTPATIENT)
Dept: FAMILY MEDICINE CLINIC | Facility: CLINIC | Age: 43
End: 2023-11-07

## 2023-11-07 DIAGNOSIS — J30.2 SEASONAL ALLERGIES: ICD-10-CM

## 2023-11-07 RX ORDER — ALBUTEROL SULFATE 2.5 MG/3ML
2.5 SOLUTION RESPIRATORY (INHALATION) EVERY 6 HOURS PRN
Qty: 50 EACH | Refills: 3 | Status: SHIPPED | OUTPATIENT
Start: 2023-11-07 | End: 2023-12-07

## 2023-11-07 RX ORDER — FLUTICASONE FUROATE AND VILANTEROL 200; 25 UG/1; UG/1
1 POWDER RESPIRATORY (INHALATION) DAILY
Qty: 1 EACH | Refills: 3 | Status: SHIPPED | OUTPATIENT
Start: 2023-11-07 | End: 2023-11-09

## 2023-11-07 NOTE — TELEPHONE ENCOUNTER
Per Timo bhatia to refill meds. No other meds to be ordered at this time. May last another week. To call if worsens or no improvement. MODESTO Dubois  Patient verbalizes understanding.

## 2023-11-07 NOTE — TELEPHONE ENCOUNTER
REFILL fluticasone furoate-vilanterol (BREO ELLIPTA) 200-25 MCG/ACT Inhalation Aerosol Powder, Breath Activated & ALBUTEROL FOR NEBULIZER TO [facility] SANCHES, ALSO PT SAW MODESTO MAGANA, SHE HAS BRONCHITIS, NOT SURE IF IZZY IS GOING TO PRESCRIBE SOMETHING FOR THIS? CALL PT

## 2023-11-08 ENCOUNTER — TELEPHONE (OUTPATIENT)
Dept: FAMILY MEDICINE CLINIC | Facility: CLINIC | Age: 43
End: 2023-11-08

## 2023-11-08 DIAGNOSIS — R05.3 CHRONIC COUGH: Primary | ICD-10-CM

## 2023-11-08 NOTE — TELEPHONE ENCOUNTER
Sivan states it is too soon to refill, patient picked up an inhaler on 10/25/23. Is she supposed to increase to BID?

## 2023-11-09 RX ORDER — FLUTICASONE PROPIONATE AND SALMETEROL 500; 50 UG/1; UG/1
1 POWDER RESPIRATORY (INHALATION) 2 TIMES DAILY
Qty: 60 EACH | Refills: 0 | Status: SHIPPED | OUTPATIENT
Start: 2023-11-09 | End: 2023-12-09

## 2023-12-05 DIAGNOSIS — R05.3 CHRONIC COUGH: ICD-10-CM

## 2023-12-08 RX ORDER — FLUTICASONE PROPIONATE AND SALMETEROL 500; 50 UG/1; UG/1
1 POWDER RESPIRATORY (INHALATION) 2 TIMES DAILY
Qty: 60 EACH | Refills: 0 | Status: SHIPPED | OUTPATIENT
Start: 2023-12-08

## 2023-12-08 NOTE — TELEPHONE ENCOUNTER
Asthma & COPD Medication Protocol [id number]/05/2023 07:45 PM    Asthma Action Score greater than or equal to 20    AAP/ACT given in last 12 months    Appointment in past 6 or next 3 months       Last office visit:  11/03/23  Last refill:  11/09/23  #60, no refills  Pt is not on the asthma registry, not asthmatic. No future appointments.

## 2024-01-10 DIAGNOSIS — R05.3 CHRONIC COUGH: ICD-10-CM

## 2024-01-10 RX ORDER — FLUTICASONE PROPIONATE AND SALMETEROL 50; 500 UG/1; UG/1
1 POWDER RESPIRATORY (INHALATION) 2 TIMES DAILY
Qty: 60 EACH | Refills: 0 | Status: SHIPPED | OUTPATIENT
Start: 2024-01-10

## 2024-02-15 DIAGNOSIS — R05.3 CHRONIC COUGH: ICD-10-CM

## 2024-02-15 RX ORDER — FLUTICASONE PROPIONATE AND SALMETEROL 50; 500 UG/1; UG/1
1 POWDER RESPIRATORY (INHALATION) 2 TIMES DAILY
Qty: 60 EACH | Refills: 0 | OUTPATIENT
Start: 2024-02-15

## 2024-02-16 NOTE — TELEPHONE ENCOUNTER
Called PT and she stated that she does not have a cough right now but still has some at home going to refuse this refill

## 2024-04-17 ENCOUNTER — PATIENT MESSAGE (OUTPATIENT)
Dept: FAMILY MEDICINE CLINIC | Facility: CLINIC | Age: 44
End: 2024-04-17

## 2024-04-17 DIAGNOSIS — R92.8 ABNORMAL MAMMOGRAM: Primary | ICD-10-CM

## 2024-04-17 NOTE — TELEPHONE ENCOUNTER
From: Madina Gonzales  To: Cindi Rivero  Sent: 4/17/2024 12:20 PM CDT  Subject: 6 month mammogram ultrasound    Quoc Puente     I got a letter that I need to schedule my 6 month follow up mammogram     It's says I need to obtain an order from my physician for mammogram with possible breast ultrasound     Just want to make sure i have all the needed documents before I schedule     Thank You   Madina

## 2024-05-07 ENCOUNTER — HOSPITAL ENCOUNTER (OUTPATIENT)
Dept: MAMMOGRAPHY | Age: 44
Discharge: HOME | End: 2024-05-07
Attending: INTERNAL MEDICINE
Payer: COMMERCIAL

## 2024-05-07 ENCOUNTER — HOSPITAL ENCOUNTER (OUTPATIENT)
Dept: ULTRASOUND IMAGING | Age: 44
Discharge: HOME | End: 2024-05-07
Attending: INTERNAL MEDICINE
Payer: COMMERCIAL

## 2024-05-07 DIAGNOSIS — R92.8 ABNORMAL MAMMOGRAM: ICD-10-CM

## 2024-05-07 PROCEDURE — 77061 BREAST TOMOSYNTHESIS UNI: CPT | Performed by: INTERNAL MEDICINE

## 2024-05-07 PROCEDURE — 77065 DX MAMMO INCL CAD UNI: CPT | Performed by: INTERNAL MEDICINE

## 2024-05-07 PROCEDURE — 76642 ULTRASOUND BREAST LIMITED: CPT | Performed by: INTERNAL MEDICINE

## 2024-05-08 RX ORDER — DEXTROAMPHETAMINE SACCHARATE, AMPHETAMINE ASPARTATE, DEXTROAMPHETAMINE SULFATE AND AMPHETAMINE SULFATE 5; 5; 5; 5 MG/1; MG/1; MG/1; MG/1
20 TABLET ORAL 2 TIMES DAILY
Qty: 60 TABLET | Refills: 0 | OUTPATIENT
Start: 2024-05-08 | End: 2024-06-07

## 2024-05-08 NOTE — TELEPHONE ENCOUNTER
Patient is asking for a refill, she said she quit taking this last year and recently started taking again.    LOV  09/05/2023

## 2024-06-27 ENCOUNTER — OFFICE VISIT (OUTPATIENT)
Dept: FAMILY MEDICINE CLINIC | Facility: CLINIC | Age: 44
End: 2024-06-27

## 2024-06-27 VITALS
DIASTOLIC BLOOD PRESSURE: 70 MMHG | SYSTOLIC BLOOD PRESSURE: 114 MMHG | BODY MASS INDEX: 41 KG/M2 | RESPIRATION RATE: 16 BRPM | HEART RATE: 76 BPM | TEMPERATURE: 98 F | WEIGHT: 237.25 LBS | OXYGEN SATURATION: 98 %

## 2024-06-27 DIAGNOSIS — E66.01 MORBID OBESITY WITH BMI OF 40.0-44.9, ADULT (HCC): Primary | ICD-10-CM

## 2024-06-27 PROCEDURE — 99214 OFFICE O/P EST MOD 30 MIN: CPT | Performed by: INTERNAL MEDICINE

## 2024-06-27 PROCEDURE — 3074F SYST BP LT 130 MM HG: CPT | Performed by: INTERNAL MEDICINE

## 2024-06-27 PROCEDURE — 3078F DIAST BP <80 MM HG: CPT | Performed by: INTERNAL MEDICINE

## 2024-06-27 RX ORDER — CYCLOBENZAPRINE HCL 10 MG
10 TABLET ORAL 3 TIMES DAILY
Qty: 30 TABLET | Refills: 1 | Status: SHIPPED | OUTPATIENT
Start: 2024-06-27 | End: 2024-07-17

## 2024-06-27 RX ORDER — MELOXICAM 15 MG/1
15 TABLET ORAL DAILY
Qty: 30 TABLET | Refills: 0 | Status: SHIPPED | OUTPATIENT
Start: 2024-06-27 | End: 2024-07-27

## 2024-06-27 RX ORDER — FLUTICASONE FUROATE AND VILANTEROL TRIFENATATE 200; 25 UG/1; UG/1
1 POWDER RESPIRATORY (INHALATION) DAILY
COMMUNITY
Start: 2024-02-20

## 2024-06-27 RX ORDER — TIRZEPATIDE 2.5 MG/.5ML
2.5 INJECTION, SOLUTION SUBCUTANEOUS WEEKLY
Qty: 2 ML | Refills: 0 | Status: SHIPPED | OUTPATIENT
Start: 2024-06-27 | End: 2024-06-29 | Stop reason: CLARIF

## 2024-06-27 NOTE — PROGRESS NOTES
HPI:   Madina Gonzales is a 43 year old female who is here for complaints of back pain.  Pain is located at left low back, right knee pain . Pain is described as dull. Severity is moderate. The pain radiates to no radiation of pain. Pain was precipitated by lifting, bending. Has had for  14   days. Pain is worsened by bending, twisting. Gets relief of back pain with nothing provides relief. Prior back pain hx: recurrent self limited episodes of low back pain in the past.   Her right knee has been painful on and off for years and this changes the shape of her gate and likely precipitated the back pain/  Her BMI is 40 and musculoskeletal complaints are common in the overweight population and a complication of morbid obesity.     Current Outpatient Medications   Medication Sig Dispense Refill    BREO ELLIPTA 200-25 MCG/ACT Inhalation Aerosol Powder, Breath Activated Inhale 1 puff into the lungs daily.      Tirzepatide (MOUNJARO) 2.5 MG/0.5ML Subcutaneous Solution Pen-injector Inject 2.5 mg into the skin once a week for 4 doses. 2 mL 0    Meloxicam 15 MG Oral Tab Take 1 tablet (15 mg total) by mouth daily. 30 tablet 0    cyclobenzaprine 10 MG Oral Tab Take 1 tablet (10 mg total) by mouth 3 (three) times daily for 20 days. 30 tablet 1    ADVAIR DISKUS 500-50 MCG/ACT Inhalation Aerosol Powder, Breath Activated Inhale 1 puff into the lungs 2 (two) times daily. 60 each 0    montelukast 10 MG Oral Tab Take 1 tablet (10 mg total) by mouth nightly. 90 tablet 3    albuterol 108 (90 Base) MCG/ACT Inhalation Aero Soln Inhale 2 puffs into the lungs every 6 (six) hours as needed for Wheezing. 8.5 g 0    HYDROcodone-acetaminophen 7.5-325 MG Oral Tab  (Patient not taking: Reported on 4/18/2023)       Past Medical History:    Allergic rhinitis       No past surgical history on file.    Family History   Problem Relation Age of Onset    Hypertension Father     Stroke Father     Heart Disorder Father 50        CAD    Other (Other) Father          bharath alvaradouerysm    Hypertension Mother     Other (Other) Mother         alcohol     SOCIAL HISTORY:  Social History     Socioeconomic History    Marital status:    Tobacco Use    Smoking status: Every Day     Current packs/day: 0.50     Types: Cigarettes    Smokeless tobacco: Never    Tobacco comments:     decreasing   Vaping Use    Vaping status: Never Used   Substance and Sexual Activity    Alcohol use: No    Drug use: No     Social Determinants of Health      [facility], [facility]    Housing Stability      Occupation: full time.  Exercise: three times per week.  Diet: doesn't watch    REVIEW OF SYSTEMS:   GENERAL: feels well otherwise  SKIN: denies any unusual skin lesions  LUNGS: denies shortness of breath with exertion  CARDIOVASCULAR: denies chest pain on exertion  GI: denies abdominal pain,denies heartburn  MUSCULOSKELETAL: no other joints are affected    EXAM:   /70   Pulse 76   Temp 97.9 °F (36.6 °C) (Temporal)   Resp 16   Wt 237 lb 4 oz (107.6 kg)   LMP 06/11/2024   SpO2 98%   BMI 40.72 kg/m²    GENERAL: well developed, well nourished,in no apparent distress  SKIN: no rashes,no suspicious lesions  NECK: supple,no adenopathy,no bruits  LUNGS: clear to auscultation  CARDIO: RRR without murmur  GI: good BS's,no masses, HSM or tenderness  EXTREMITIES: no cyanosis, clubbing or edema  BACK: lumbosacral tenderness, DTR's are 2+ bilaterally, strength is 5+/5, sensation is intact    ASSESSMENT:   Madina Gonzales is a 43 year old female who presents with complaints of back pain. Findings are C/W Lumbar Musculoskeletal Strain.      PLAN:   NSAIDS, a muscle relaxer  To prevent further knee and back pan she must reduce forces on these areas by weight loss and exercise.  She would benefit from a GLP1 if approved.  The patient indicates understanding of these issues and agrees to the plan.  The patient is asked to return if sx's persist or  worsen.

## 2024-06-29 RX ORDER — TIRZEPATIDE 2.5 MG/.5ML
2.5 INJECTION, SOLUTION SUBCUTANEOUS WEEKLY
Qty: 2 ML | Refills: 0 | Status: SHIPPED | OUTPATIENT
Start: 2024-06-29 | End: 2024-07-21

## 2024-07-05 ENCOUNTER — PATIENT MESSAGE (OUTPATIENT)
Dept: FAMILY MEDICINE CLINIC | Facility: CLINIC | Age: 44
End: 2024-07-05

## 2024-07-06 NOTE — TELEPHONE ENCOUNTER
From: Madina Gonzales  To: Cindi Rivero  Sent: 7/5/2024 2:43 PM CDT  Subject: Zepbound    Quoc Puente     I went to  the zepbound and they said there were insurance issues    Not sure what the next step is should I reach out to them or do you contact them to see if we can push it through?    Thank you   Madina

## 2024-07-15 ENCOUNTER — TELEPHONE (OUTPATIENT)
Dept: FAMILY MEDICINE CLINIC | Facility: CLINIC | Age: 44
End: 2024-07-15

## 2024-07-15 NOTE — TELEPHONE ENCOUNTER
PRIOR AUTHORIZATION IS REQUIRED FOR ZEPBOUND, SHE WILL FAX INFORMATION OVER OR IT CAN BE DONE THRU COVER MY MEDICATIONS

## 2024-08-06 ENCOUNTER — PATIENT MESSAGE (OUTPATIENT)
Dept: FAMILY MEDICINE CLINIC | Facility: CLINIC | Age: 44
End: 2024-08-06

## 2024-08-07 RX ORDER — TIRZEPATIDE 5 MG/.5ML
5 INJECTION, SOLUTION SUBCUTANEOUS WEEKLY
Qty: 2 ML | Refills: 0 | Status: SHIPPED | OUTPATIENT
Start: 2024-08-07 | End: 2024-08-29

## 2024-08-07 NOTE — TELEPHONE ENCOUNTER
From: Madina Gonzales  To: Cindi Rivero  Sent: 8/6/2024 9:23 PM CDT  Subject: Zepbound     Hello! When is my refill scheduled for the Zepbound I need it by August 20th   Will I be prescribed the 5mg     Thanks

## 2024-08-27 ENCOUNTER — PATIENT MESSAGE (OUTPATIENT)
Dept: FAMILY MEDICINE CLINIC | Facility: CLINIC | Age: 44
End: 2024-08-27

## 2024-08-28 RX ORDER — TIRZEPATIDE 7.5 MG/.5ML
7.5 INJECTION, SOLUTION SUBCUTANEOUS WEEKLY
Qty: 2 ML | Refills: 0 | Status: SHIPPED | OUTPATIENT
Start: 2024-08-28 | End: 2024-09-19

## 2024-08-28 NOTE — TELEPHONE ENCOUNTER
From: Madina Gonzales  To: Cindi Rivero  Sent: 8/27/2024 5:33 PM CDT  Subject: Zepbound Shot    I was giving myself the second .5 zepbound shot today and I hit the injection button but it never pierced my skin there is always an injection socorro when I do them this is the first time that happened?  So I had to use my 3rd pen for the month which is going to leave me short a pen for this month   Can you send in a prescription a week early not sure what to do     Thank you  Madina

## 2024-09-03 ENCOUNTER — PATIENT MESSAGE (OUTPATIENT)
Dept: FAMILY MEDICINE CLINIC | Facility: CLINIC | Age: 44
End: 2024-09-03

## 2024-09-03 RX ORDER — CETIRIZINE HYDROCHLORIDE 10 MG/1
10 TABLET ORAL DAILY
Qty: 90 TABLET | Refills: 0 | Status: SHIPPED | OUTPATIENT
Start: 2024-09-03 | End: 2024-12-02

## 2024-09-03 NOTE — TELEPHONE ENCOUNTER
From: Madina Gonzalse  To: Cindi Rivero  Sent: 9/3/2024 12:06 PM CDT  Subject: Cetirizine refill     Hello! I'm out of cetirizine can you send in a refill   Thank you

## 2024-09-17 ENCOUNTER — HOSPITAL ENCOUNTER (OUTPATIENT)
Dept: GENERAL RADIOLOGY | Age: 44
Discharge: HOME | End: 2024-09-17
Attending: INTERNAL MEDICINE
Payer: COMMERCIAL

## 2024-09-17 ENCOUNTER — OFFICE VISIT (OUTPATIENT)
Dept: FAMILY MEDICINE CLINIC | Facility: CLINIC | Age: 44
End: 2024-09-17
Payer: COMMERCIAL

## 2024-09-17 VITALS
DIASTOLIC BLOOD PRESSURE: 72 MMHG | SYSTOLIC BLOOD PRESSURE: 116 MMHG | WEIGHT: 224.25 LBS | RESPIRATION RATE: 16 BRPM | HEART RATE: 78 BPM | OXYGEN SATURATION: 98 % | TEMPERATURE: 98 F | BODY MASS INDEX: 38 KG/M2

## 2024-09-17 DIAGNOSIS — M25.561 CHRONIC PAIN OF RIGHT KNEE: ICD-10-CM

## 2024-09-17 DIAGNOSIS — G89.29 CHRONIC LEFT-SIDED LOW BACK PAIN WITH SCIATICA, SCIATICA LATERALITY UNSPECIFIED: Primary | ICD-10-CM

## 2024-09-17 DIAGNOSIS — M54.40 CHRONIC LEFT-SIDED LOW BACK PAIN WITH SCIATICA, SCIATICA LATERALITY UNSPECIFIED: Primary | ICD-10-CM

## 2024-09-17 DIAGNOSIS — G89.29 CHRONIC PAIN OF RIGHT KNEE: ICD-10-CM

## 2024-09-17 PROCEDURE — 3078F DIAST BP <80 MM HG: CPT | Performed by: INTERNAL MEDICINE

## 2024-09-17 PROCEDURE — 99214 OFFICE O/P EST MOD 30 MIN: CPT | Performed by: INTERNAL MEDICINE

## 2024-09-17 PROCEDURE — 73562 X-RAY EXAM OF KNEE 3: CPT | Performed by: INTERNAL MEDICINE

## 2024-09-17 PROCEDURE — 3074F SYST BP LT 130 MM HG: CPT | Performed by: INTERNAL MEDICINE

## 2024-09-17 RX ORDER — DICLOFENAC SODIUM 75 MG/1
75 TABLET, DELAYED RELEASE ORAL 2 TIMES DAILY
Qty: 60 TABLET | Refills: 0 | Status: SHIPPED | OUTPATIENT
Start: 2024-09-17 | End: 2024-10-17

## 2024-09-17 NOTE — PROGRESS NOTES
Madina Gonzales is a 44 year old female.  HPI:   Pt has been having pain in the low back for 2 years, now it is radiating to the left hip; she had an office visit 6/27/2024 and started conservative treatment, NSAIDS and a muscle relaxer, this was not effective.  She has lost 15 pounds since then, but mobility is still impaired.  She tries to walk, but this exacerbated her knee and back pain.  She has felt like all her adult life she has had pain in the shoulders, neck, low back and knees out of proprotion to her age equivalents.   Current Outpatient Medications   Medication Sig Dispense Refill    cetirizine 10 MG Oral Tab Take 1 tablet (10 mg total) by mouth daily. 90 tablet 0    Tirzepatide-Weight Management (ZEPBOUND) 7.5 MG/0.5ML Subcutaneous Solution Auto-injector Inject 7.5 mg into the skin once a week for 4 doses. 2 mL 0    ADVAIR DISKUS 500-50 MCG/ACT Inhalation Aerosol Powder, Breath Activated Inhale 1 puff into the lungs 2 (two) times daily. 60 each 0    montelukast 10 MG Oral Tab Take 1 tablet (10 mg total) by mouth nightly. 90 tablet 3    BREO ELLIPTA 200-25 MCG/ACT Inhalation Aerosol Powder, Breath Activated Inhale 1 puff into the lungs daily. (Patient not taking: Reported on 9/17/2024)      albuterol 108 (90 Base) MCG/ACT Inhalation Aero Soln Inhale 2 puffs into the lungs every 6 (six) hours as needed for Wheezing. (Patient not taking: Reported on 9/17/2024) 8.5 g 0      Past Medical History:    Allergic rhinitis      Social History:  Social History     Socioeconomic History    Marital status:    Tobacco Use    Smoking status: Every Day     Current packs/day: 0.50     Types: Cigarettes    Smokeless tobacco: Never    Tobacco comments:     decreasing   Vaping Use    Vaping status: Never Used   Substance and Sexual Activity    Alcohol use: No    Drug use: No     Social Determinants of Health      [facility]    Housing Stability         REVIEW OF SYSTEMS:   GENERAL HEALTH: feels well otherwise  SKIN: denies any unusual skin lesions or rashes  RESPIRATORY: denies shortness of breath with exertion  CARDIOVASCULAR: denies chest pain on exertion  GI: denies abdominal pain and denies heartburn  NEURO: denies headaches    EXAM:   /72   Pulse 78   Temp 97.9 °F (36.6 °C) (Temporal)   Resp 16   Wt 224 lb 4 oz (101.7 kg)   LMP 06/11/2024   SpO2 98%   BMI 38.49 kg/m²   GENERAL: well developed, well nourished,in no apparent distress  SKIN: no rashes,no suspicious lesions  HEENT: atraumatic, normocephalic,ears and throat are clear  NECK: supple,no adenopathy,no bruits  LUNGS: clear to auscultation  CARDIO: RRR without murmur  GI: good BS's,no masses, HSM or tenderness  EXTREMITIES: no cyanosis, clubbing or edema; left knee crepitous without instability; low back pain with lateral movement, pain with straight leg raise on the left.     ASSESSMENT AND PLAN:     Encounter Diagnoses   Name Primary?    Chronic left-sided low back pain with sciatica, sciatica laterality unspecified Yes    Chronic pain of right knee    Xray, PT and diclofenac, we discussed the possibility of early arthritis and DDD.  XRays and if needed further imaging for DDD in the low back.     No orders of the defined types were placed in this encounter.      Meds & Refills for this Visit:  Requested Prescriptions      No prescriptions requested or ordered in this encounter       Imaging & Consults:  OP REFERRAL TO [facility]  MRI SPINE LUMBAR (W+WO) (CPT=72158)  XR KNEE (3 VIEWS), RIGHT (CPT=73562)    Follow up as needed.    The patient indicates understanding of these issues and agrees to the plan.

## 2024-09-23 RX ORDER — TIRZEPATIDE 7.5 MG/.5ML
7.5 INJECTION, SOLUTION SUBCUTANEOUS WEEKLY
Qty: 2 ML | Refills: 0 | Status: SHIPPED | OUTPATIENT
Start: 2024-09-23

## 2024-09-23 NOTE — TELEPHONE ENCOUNTER
Was unsure if you wanted to do a dose change     ZEPBOUND 7.5 MG/0.5ML Subcutaneous Solution Auto-injector   Last office visit:  Future Appointments   Date Time Provider Department Center   9/24/2024  3:30 PM SW XR RM 1 SW XRAY [place]   10/22/2024  1:45 PM PF MRI RM1 (1.5T) PF MRI [place]   Last filled:  6/27/24    Last labs:  11/3/23

## 2024-09-24 ENCOUNTER — HOSPITAL ENCOUNTER (OUTPATIENT)
Dept: GENERAL RADIOLOGY | Age: 44
Discharge: HOME | End: 2024-09-24
Attending: INTERNAL MEDICINE
Payer: COMMERCIAL

## 2024-09-24 DIAGNOSIS — G89.29 CHRONIC LEFT-SIDED LOW BACK PAIN WITH SCIATICA, SCIATICA LATERALITY UNSPECIFIED: ICD-10-CM

## 2024-09-24 DIAGNOSIS — M54.40 CHRONIC LEFT-SIDED LOW BACK PAIN WITH SCIATICA, SCIATICA LATERALITY UNSPECIFIED: ICD-10-CM

## 2024-09-24 PROCEDURE — 72110 X-RAY EXAM L-2 SPINE 4/>VWS: CPT | Performed by: INTERNAL MEDICINE

## 2024-10-07 ENCOUNTER — PATIENT MESSAGE (OUTPATIENT)
Dept: FAMILY MEDICINE CLINIC | Facility: CLINIC | Age: 44
End: 2024-10-07

## 2024-10-07 RX ORDER — TIRZEPATIDE 10 MG/.5ML
10 INJECTION, SOLUTION SUBCUTANEOUS WEEKLY
Qty: 2 ML | Refills: 0 | Status: SHIPPED | OUTPATIENT
Start: 2024-10-07 | End: 2024-10-29

## 2024-10-07 NOTE — TELEPHONE ENCOUNTER
From: Madina Gonzales  To: Cindi Rivero  Sent: 10/7/2024 9:24 AM CDT  Subject: Zepbound    Hello!     I am ready to move up to the 10mg Zepbound when you send in the next prescription for this medication     Thank You  Madina

## 2024-10-22 ENCOUNTER — HOSPITAL ENCOUNTER (OUTPATIENT)
Dept: MRI IMAGING | Age: 44
Discharge: HOME | End: 2024-10-22
Attending: INTERNAL MEDICINE
Payer: COMMERCIAL

## 2024-10-22 DIAGNOSIS — G89.29 CHRONIC LEFT-SIDED LOW BACK PAIN WITH SCIATICA, SCIATICA LATERALITY UNSPECIFIED: ICD-10-CM

## 2024-10-22 DIAGNOSIS — M54.40 CHRONIC LEFT-SIDED LOW BACK PAIN WITH SCIATICA, SCIATICA LATERALITY UNSPECIFIED: ICD-10-CM

## 2024-10-22 PROCEDURE — A9575 INJ GADOTERATE MEGLUMI 0.1ML: HCPCS | Performed by: INTERNAL MEDICINE

## 2024-10-22 PROCEDURE — 72158 MRI LUMBAR SPINE W/O & W/DYE: CPT | Performed by: INTERNAL MEDICINE

## 2024-10-22 RX ORDER — GADOTERATE MEGLUMINE 376.9 MG/ML
20 INJECTION INTRAVENOUS
Status: COMPLETED | OUTPATIENT
Start: 2024-10-22 | End: 2024-10-22

## 2024-10-22 RX ADMIN — GADOTERATE MEGLUMINE 20 ML: 376.9 INJECTION INTRAVENOUS at 14:53:00

## 2024-10-30 ENCOUNTER — PATIENT MESSAGE (OUTPATIENT)
Dept: FAMILY MEDICINE CLINIC | Facility: CLINIC | Age: 44
End: 2024-10-30

## 2024-10-30 RX ORDER — TIRZEPATIDE 10 MG/.5ML
10 INJECTION, SOLUTION SUBCUTANEOUS WEEKLY
Qty: 2 ML | Refills: 0 | Status: SHIPPED | OUTPATIENT
Start: 2024-10-30 | End: 2024-11-21

## 2024-11-04 DIAGNOSIS — J30.2 SEASONAL ALLERGIES: ICD-10-CM

## 2024-11-04 RX ORDER — MONTELUKAST SODIUM 10 MG/1
10 TABLET ORAL NIGHTLY
Qty: 90 TABLET | Refills: 0 | Status: SHIPPED | OUTPATIENT
Start: 2024-11-04

## 2024-11-15 ENCOUNTER — TELEPHONE (OUTPATIENT)
Dept: PHYSICAL THERAPY | Facility: HOSPITAL | Age: 44
End: 2024-11-15

## 2024-11-18 NOTE — PROGRESS NOTES
SPINE EVALUATION:     Diagnosis:   LBP with L sided LE pain      Referring Provider: Cindi Rivero  Date of Evaluation:    11/19/2024    Precautions:  None Next MD visit:   none scheduled  Date of Surgery: n/a     PATIENT SUMMARY   Madina Gonzales is a 44 year old female who presents to therapy today with complaints of LBP with pain that radiates into L hip. Pain present for about 2 yrs. Also have pain in R knee and getting that checked out by orthopedics.   Pt describes pain level current 7/10, at best 7/10, at worst 9/10.   Current functional limitations include limited lifting and carrying, difficulty with home maintenance tasks.     Madina describes prior level of function as unrestricted with all ADLs. Pt goals include reduce pain.  Past medical history was reviewed with Madina. Significant findings include  has a past medical history of Allergic rhinitis.   Pt denies diplopia, dysarthria, dysphasia, dizziness, drop attacks, bowel/bladder changes, saddle anesthesia, and DARYL LE N/T.    ASSESSMENT  Madina presents to physical therapy evaluation with primary c/o LBP with L LE pain. The results of the objective tests and measures show decreased core strength.  Functional deficits include but are not limited to difficulty with lifting, carrying, home maintenance tasks.  Signs and symptoms are consistent with diagnosis of SI strain. Pt and PT discussed evaluation findings, pathology, POC and HEP.  Pt voiced understanding and performs HEP correctly without reported pain. Skilled Physical Therapy is medically necessary to address the above impairments and reach functional goals.     OBJECTIVE:   Observation/Posture: Normal lumbar lordosis  Neuro Screen: Light touch sensation intact B LE dermatomes.    Lumbar AROM: (* denotes performed with pain)  Flexion: 100  Extension: 20*  Sidebending: R 30; L 20  Rotation: R 80; L 80      Palpation: Tender L glute    Strength: (* denotes performed with pain)  LE   Hip flexion (L2): R  5/5; L 5/5  Hip abduction: R 4+/5; L 4+/5  Hip Extension: R 4+/5; L 4+/5   Hip ER: R 4+/5; L 4+/5  Hip IR: R 5/5; L 5/5  Knee Flexion: R 5/5; L 5/5   Knee extension (L3): R 5/5; L 5/5   DF (L4): R 5/5; L 5/5  Great Toe Ext (L5): R 5/5, L 5/5  PF (S1): R 5/5; L 5/5     Flexibility:   LE   Hip Flexor: Rmoderate tightness, L Moderate tightness  Hamstrings: R Mild tightness; L Mild tightness  Piriformis: R Mild tightness; L Moderate tightness  Quads: R Mild tightness; L Mild tightness  Gastroc-soleus: R Mild tightness; L Mild tightness     Special tests: + standing stork test L    Gait: pt ambulates on level ground with normal mechanics.  Balance: SLS R 20, L 20    Today’s Treatment and Response:   Pt education was provided on exam findings, treatment diagnosis, treatment plan, expectations, and prognosis. Pt was also provided recommendations for activity modifications, possible soreness after evaluation, modalities as needed [ice/heat], and postural corrections  Patient was instructed in and issued a HEP for: Access Code: [email or web address]  URL: [email or web address]  Date: 11/19/2024  Prepared by: Jackson Pineda    Exercises  - Hip Flexor Stretch at Edge of Bed  - 1 x daily - 7 x weekly - 1 sets - 4 reps - 30\" hold  - Hip Flexor Stretch with Chair  - 1 x daily - 7 x weekly - 1 sets - 4 reps - 30\" hold  - Supine Piriformis Stretch  - 1 x daily - 7 x weekly - 1 sets - 4 reps - 30\" hold  - Supine Piriformis Stretch with Leg Straight  - 1 x daily - 7 x weekly - 1 sets - 4 reps - 30\" hold  - Supine Hamstring Stretch  - 1 x daily - 7 x weekly - 1 sets - 4 reps - 30\" hold  - Standing Hamstring Stretch with Step  - 1 x daily - 7 x weekly - 1 sets - 4 reps - 30\" hold  - Supine Lower Trunk Rotation  - 1 x daily - 7 x weekly - 1 sets - 4 reps - 30\" hold    Charges: PT Eval Moderate Complexity, therapeutic ex      Total Timed Treatment: 13 min     Total Treatment Time: 45 min     Based on clinical rationale and outcome  measures, this evaluation involved Moderate Complexity decision making due to 1-2 personal factors/comorbidities, 3 body structures involved/activity limitations, and evolving symptoms including changing pain levels.  PLAN OF CARE:    Goals: (to be met in 10 visits)   Pt will improve transversus abdominis recruitment to perform proper isometric contraction without requiring verbal or tactile cuing to promote advancement of therex   Pt will demonstrate good understanding of proper posture and body mechanics to decrease pain and improve spinal safety   Pt will report improved symptom centralization and absence of radicular symptoms for 3 consecutive days to improve function with ADL   Pt will have decreased paraspinal mm tension to tolerate standing >45 minutes for work and home activities   Pt will demonstrate improved core strength to be able to perform lift/carry with <2/10 pain   Pt will be independent and compliant with comprehensive HEP to maintain progress achieved in PT      Frequency / Duration: Patient will be seen for 2 x/week or a total of 10 visits over a 90 day period. Treatment will include: Manual Therapy, Neuromuscular Re-education, Therapeutic Exercise, and Home Exercise Program instruction    Education or treatment limitation: None  Rehab Potential:good    Oswestry Disability Index Score  Score: (Patient-Rptd) 40 % (11/18/2024 10:13 AM)      Patient/Family/Caregiver was advised of these findings, precautions, and treatment options and has agreed to actively participate in planning and for this course of care.    Thank you for your referral. Please co-sign or sign and return this letter via fax as soon as possible to [phone]. If you have any questions, please contact me at Dept: [phone]    Sincerely,  Electronically signed by therapist: Jackson Pineda, PT    Physician's certification required: Yes  I certify the need for these services furnished under this plan of treatment and while under  my care.    X___________________________________________________ Date____________________    Certification From: 11/19/2024  To:2/18/2025

## 2024-11-19 ENCOUNTER — OFFICE VISIT (OUTPATIENT)
Dept: PHYSICAL THERAPY | Age: 44
End: 2024-11-19
Attending: INTERNAL MEDICINE
Payer: COMMERCIAL

## 2024-11-19 DIAGNOSIS — G89.29 CHRONIC LEFT-SIDED LOW BACK PAIN WITH SCIATICA, SCIATICA LATERALITY UNSPECIFIED: Primary | ICD-10-CM

## 2024-11-19 DIAGNOSIS — M54.40 CHRONIC LEFT-SIDED LOW BACK PAIN WITH SCIATICA, SCIATICA LATERALITY UNSPECIFIED: Primary | ICD-10-CM

## 2024-11-19 PROCEDURE — 97110 THERAPEUTIC EXERCISES: CPT

## 2024-11-19 PROCEDURE — 97161 PT EVAL LOW COMPLEX 20 MIN: CPT

## 2024-11-21 NOTE — PROGRESS NOTES
Diagnosis:   LBP w L sided sciatica      Referring Provider: Cindi Rivero  Date of Evaluation:    11/19/2024    Precautions:  None Next MD visit:   none scheduled  Date of Surgery: n/a   Insurance Primary/Secondary: [employer] [place] HMO / N/A     # Auth Visits: 5 through 12/31            Subjective: Stretches at home are helping.     Pain: 7/10      Objective:   Flexibility:   LE   Hip Flexor: R moderate tightness, L Moderate tightness  Hamstrings: R Mild tightness; L Mild tightness  Piriformis: R Mild tightness; L Moderate tightness  Quads: R Mild tightness; L Mild tightness  Gastroc-soleus: R Mild tightness; L Mild tightness        Assessment: Instructed patient in TA bracing including function, anatomy and activity.       Goals:   (to be met in 10 visits)   Pt will improve transversus abdominis recruitment to perform proper isometric contraction without requiring verbal or tactile cuing to promote advancement of therex   Pt will demonstrate good understanding of proper posture and body mechanics to decrease pain and improve spinal safety   Pt will report improved symptom centralization and absence of radicular symptoms for 3 consecutive days to improve function with ADL   Pt will have decreased paraspinal mm tension to tolerate standing >45 minutes for work and home activities   Pt will demonstrate improved core strength to be able to perform lift/carry with <2/10 pain   Pt will be independent and compliant with comprehensive HEP to maintain progress achieved in PT         Plan: Progress core stability  Date: 11/21/2024  TX#: 2/5 Date:                 TX#: 3/ Date:                 TX#: 4/ Date:                 TX#: 5/ Date:   Tx#: 6/   THERAPEUTIC EX  TM 2mph 8'  SL hip flexor stretch 3x30\"  Piriformis stretch 3x30\"  Glute stretch 3x30\"  Hamstring stretch 3x30\"  Clamshells 3x10 B         MANUAL THERAPY  STM L piriformis in R SL 13'       NEURO RE-ED  TA instruction/anatomy   TA bracing 10\" 3x10              HEP: Access  Code: [email or web address]  URL: [email or web address]  Date: 11/19/2024  Prepared by: Jackson Pineda     Exercises  - Hip Flexor Stretch at Edge of Bed  - 1 x daily - 7 x weekly - 1 sets - 4 reps - 30\" hold  - Hip Flexor Stretch with Chair  - 1 x daily - 7 x weekly - 1 sets - 4 reps - 30\" hold  - Supine Piriformis Stretch  - 1 x daily - 7 x weekly - 1 sets - 4 reps - 30\" hold  - Supine Piriformis Stretch with Leg Straight  - 1 x daily - 7 x weekly - 1 sets - 4 reps - 30\" hold  - Supine Hamstring Stretch  - 1 x daily - 7 x weekly - 1 sets - 4 reps - 30\" hold  - Standing Hamstring Stretch with Step  - 1 x daily - 7 x weekly - 1 sets - 4 reps - 30\" hold  - Supine Lower Trunk Rotation  - 1 x daily - 7 x weekly - 1 sets - 4 reps - 30\" hold       Charges: therapeutic ex 2, man therapy, neuro re-ed       Total Timed Treatment: 45 min  Total Treatment Time: 45 min

## 2024-11-25 ENCOUNTER — OFFICE VISIT (OUTPATIENT)
Dept: PHYSICAL THERAPY | Age: 44
End: 2024-11-25
Attending: INTERNAL MEDICINE
Payer: COMMERCIAL

## 2024-11-25 PROCEDURE — 97112 NEUROMUSCULAR REEDUCATION: CPT

## 2024-11-25 PROCEDURE — 97140 MANUAL THERAPY 1/> REGIONS: CPT

## 2024-11-25 PROCEDURE — 97110 THERAPEUTIC EXERCISES: CPT

## 2024-11-26 ENCOUNTER — PATIENT MESSAGE (OUTPATIENT)
Dept: FAMILY MEDICINE CLINIC | Facility: CLINIC | Age: 44
End: 2024-11-26

## 2024-11-26 DIAGNOSIS — G89.29 CHRONIC PAIN OF LEFT KNEE: Primary | ICD-10-CM

## 2024-11-26 DIAGNOSIS — M25.562 CHRONIC PAIN OF LEFT KNEE: Primary | ICD-10-CM

## 2024-11-27 RX ORDER — TIRZEPATIDE 7.5 MG/.5ML
7.5 INJECTION, SOLUTION SUBCUTANEOUS WEEKLY
Qty: 2 ML | Refills: 0 | OUTPATIENT
Start: 2024-11-27

## 2024-11-27 NOTE — TELEPHONE ENCOUNTER
The original prescription was discontinued on 10/7/2024 by Cindi Rivero MD. Renewing this prescription may not be appropriate.     ZEPBOUND 7.5 MG/0.5ML Subcutaneous Solution Auto-injector  Last office visit:  6/27/24    Future Appointments   Date Time Provider Department Center   12/2/2024  4:30 PM Jackson Pineda, PT [facility] [place]   12/5/2024  1:45 PM Cindi Rivero MD [facility] [place]   12/9/2024  3:45 PM Jacskon Pineda, PT [facility] [place]   12/16/2024  3:45 PM Jackson Pineda, PT [facility] [place]   12/23/2024  4:30 PM Jackson Pineda PT [facility] [place]   Last filled:  11/26/24   2 ML   Last labs:  9/5/23      Duplicate request     Called Madina and left a message for her on an identified voice mail that she is due for a physical and to call the office or set this up on [facility].

## 2024-12-02 ENCOUNTER — OFFICE VISIT (OUTPATIENT)
Dept: PHYSICAL THERAPY | Age: 44
End: 2024-12-02
Attending: INTERNAL MEDICINE
Payer: COMMERCIAL

## 2024-12-02 PROCEDURE — 97112 NEUROMUSCULAR REEDUCATION: CPT

## 2024-12-02 PROCEDURE — 97110 THERAPEUTIC EXERCISES: CPT

## 2024-12-02 PROCEDURE — 97140 MANUAL THERAPY 1/> REGIONS: CPT

## 2024-12-02 NOTE — PROGRESS NOTES
Diagnosis:   LBP w L sided sciatica      Referring Provider: Cindi Rivero  Date of Evaluation:    11/19/2024    Precautions:  None Next MD visit:   none scheduled  Date of Surgery: n/a   Insurance Primary/Secondary: [place] / N/A     # Auth Visits: 5 through 12/31            Subjective: Continue to feel better overall.     Pain: 7/10      Objective:   Flexibility:   LE   Hip Flexor: R moderate tightness, L Moderate tightness  Hamstrings: R Mild tightness; L Mild tightness  Piriformis: R Mild tightness; L Moderate tightness  Quads: R Mild tightness; L Mild tightness  Gastroc-soleus: R Mild tightness; L Mild tightness        Assessment: Progressed TA bracing to hip add squeeze and hip abd/ER to increase core stability. Issued both to HEP.       Goals:   (to be met in 10 visits)   Pt will improve transversus abdominis recruitment to perform proper isometric contraction without requiring verbal or tactile cuing to promote advancement of therex   Pt will demonstrate good understanding of proper posture and body mechanics to decrease pain and improve spinal safety   Pt will report improved symptom centralization and absence of radicular symptoms for 3 consecutive days to improve function with ADL   Pt will have decreased paraspinal mm tension to tolerate standing >45 minutes for work and home activities   Pt will demonstrate improved core strength to be able to perform lift/carry with <2/10 pain   Pt will be independent and compliant with comprehensive HEP to maintain progress achieved in PT         Plan: Progress core stability  Date: 11/21/2024  TX#: 2/5 Date: 12/2/2024                TX#: 3/5 Date:                 TX#: 4/ Date:                 TX#: 5/ Date:   Tx#: 6/   THERAPEUTIC EX  TM 2mph 8'  SL hip flexor stretch 3x30\"  Piriformis stretch 3x30\"  Glute stretch 3x30\"  Hamstring stretch 3x30\"  Clamshells 3x10 B   THERAPEUTIC EX  TM 2mph 8'  SL hip flexor stretch 3x30\"  Piriformis stretch 3x30\"  Glute stretch  3x30\"  Hamstring stretch 3x30\"  Clamshells 3x10 B      MANUAL THERAPY  STM L piriformis in R SL 13' MANUAL THERAPY  STM L piriformis in R SL 13'      NEURO RE-ED  TA instruction/anatomy   TA bracing 10\" 3x10 NEURO RE-ED  TA instruction/anatomy   TA bracing 10\" 3x10  TA bracing 10\" with hip add squeeze 3x10  TA bracing 10\" with hip abd/ER green 3x10             HEP: Access Code: [email or web address]  URL: [email or web address]  Date: 11/19/2024  Prepared by: Jackson Pineda     Exercises  - Hip Flexor Stretch at Edge of Bed  - 1 x daily - 7 x weekly - 1 sets - 4 reps - 30\" hold  - Hip Flexor Stretch with Chair  - 1 x daily - 7 x weekly - 1 sets - 4 reps - 30\" hold  - Supine Piriformis Stretch  - 1 x daily - 7 x weekly - 1 sets - 4 reps - 30\" hold  - Supine Piriformis Stretch with Leg Straight  - 1 x daily - 7 x weekly - 1 sets - 4 reps - 30\" hold  - Supine Hamstring Stretch  - 1 x daily - 7 x weekly - 1 sets - 4 reps - 30\" hold  - Standing Hamstring Stretch with Step  - 1 x daily - 7 x weekly - 1 sets - 4 reps - 30\" hold  - Supine Lower Trunk Rotation  - 1 x daily - 7 x weekly - 1 sets - 4 reps - 30\" hold       Charges: therapeutic ex 2, man therapy, neuro re-ed       Total Timed Treatment: 45 min  Total Treatment Time: 45 min

## 2024-12-09 ENCOUNTER — OFFICE VISIT (OUTPATIENT)
Dept: PHYSICAL THERAPY | Age: 44
End: 2024-12-09
Attending: INTERNAL MEDICINE
Payer: COMMERCIAL

## 2024-12-09 PROCEDURE — 97110 THERAPEUTIC EXERCISES: CPT

## 2024-12-09 PROCEDURE — 97140 MANUAL THERAPY 1/> REGIONS: CPT

## 2024-12-09 PROCEDURE — 97112 NEUROMUSCULAR REEDUCATION: CPT

## 2024-12-09 NOTE — PROGRESS NOTES
Diagnosis:   LBP w L sided sciatica      Referring Provider: Cindi Rivero  Date of Evaluation:    11/19/2024    Precautions:  None Next MD visit:   none scheduled  Date of Surgery: n/a   Insurance Primary/Secondary: [place] / N/A     # Auth Visits: 5 through 12/31            Subjective: No major changes yet.     Pain: 7/10      Objective:   Flexibility:   LE   Hip Flexor: R moderate tightness, L Moderate tightness  Hamstrings: R Mild tightness; L Mild tightness  Piriformis: R Mild tightness; L Moderate tightness  Quads: R Mild tightness; L Mild tightness  Gastroc-soleus: R Mild tightness; L Mild tightness        Assessment: Progressed TA to tband shoulder extension.       Goals:   (to be met in 10 visits)   Pt will improve transversus abdominis recruitment to perform proper isometric contraction without requiring verbal or tactile cuing to promote advancement of therex   Pt will demonstrate good understanding of proper posture and body mechanics to decrease pain and improve spinal safety   Pt will report improved symptom centralization and absence of radicular symptoms for 3 consecutive days to improve function with ADL   Pt will have decreased paraspinal mm tension to tolerate standing >45 minutes for work and home activities   Pt will demonstrate improved core strength to be able to perform lift/carry with <2/10 pain   Pt will be independent and compliant with comprehensive HEP to maintain progress achieved in PT         Plan: Progress core stability  Date: 11/21/2024  TX#: 2/5 Date: 12/2/2024                TX#: 3/5 Date: 12/9/2024                TX#: 4/5 Date:                 TX#: 5/ Date:   Tx#: 6/   THERAPEUTIC EX  TM 2mph 8'  SL hip flexor stretch 3x30\"  Piriformis stretch 3x30\"  Glute stretch 3x30\"  Hamstring stretch 3x30\"  Clamshells 3x10 B   THERAPEUTIC EX  TM 2mph 8'  SL hip flexor stretch 3x30\"  Piriformis stretch 3x30\"  Glute stretch 3x30\"  Hamstring stretch 3x30\"  Clamshells 3x10 B THERAPEUTIC  EX  TM 2mph 8'  SL hip flexor stretch 3x30\"  Piriformis stretch 3x30\"  Glute stretch 3x30\"  Hamstring stretch 3x30\"  Clamshells 3x10 B     MANUAL THERAPY  STM L piriformis in R SL 13' MANUAL THERAPY  STM L piriformis in R SL 13' MANUAL THERAPY  STM L piriformis in R SL 13'     NEURO RE-ED  TA instruction/anatomy   TA bracing 10\" 3x10 NEURO RE-ED  TA instruction/anatomy   TA bracing 10\" 3x10  TA bracing 10\" with hip add squeeze 3x10  TA bracing 10\" with hip abd/ER green 3x10 NEURO RE-ED  TA instruction/anatomy   TA bracing 10\" 3x10  TA bracing 10\" with hip add squeeze 3x10  TA bracing 10\" with hip abd/ER green 3x10  TA bracing with shoulder ext 3x10 grn            HEP: Access Code: [email or web address]  URL: [email or web address]  Date: 11/19/2024  Prepared by: Jackson Pineda     Exercises  - Hip Flexor Stretch at Edge of Bed  - 1 x daily - 7 x weekly - 1 sets - 4 reps - 30\" hold  - Hip Flexor Stretch with Chair  - 1 x daily - 7 x weekly - 1 sets - 4 reps - 30\" hold  - Supine Piriformis Stretch  - 1 x daily - 7 x weekly - 1 sets - 4 reps - 30\" hold  - Supine Piriformis Stretch with Leg Straight  - 1 x daily - 7 x weekly - 1 sets - 4 reps - 30\" hold  - Supine Hamstring Stretch  - 1 x daily - 7 x weekly - 1 sets - 4 reps - 30\" hold  - Standing Hamstring Stretch with Step  - 1 x daily - 7 x weekly - 1 sets - 4 reps - 30\" hold  - Supine Lower Trunk Rotation  - 1 x daily - 7 x weekly - 1 sets - 4 reps - 30\" hold       Charges: therapeutic ex 2, man therapy, neuro re-ed       Total Timed Treatment: 45 min  Total Treatment Time: 45 min

## 2024-12-16 ENCOUNTER — APPOINTMENT (OUTPATIENT)
Dept: PHYSICAL THERAPY | Age: 44
End: 2024-12-16
Attending: INTERNAL MEDICINE
Payer: COMMERCIAL

## 2024-12-16 ENCOUNTER — OFFICE VISIT (OUTPATIENT)
Dept: PHYSICAL THERAPY | Age: 44
End: 2024-12-16
Attending: INTERNAL MEDICINE
Payer: COMMERCIAL

## 2024-12-16 ENCOUNTER — TELEPHONE (OUTPATIENT)
Dept: ORTHOPEDICS CLINIC | Facility: CLINIC | Age: 44
End: 2024-12-16

## 2024-12-16 PROCEDURE — 97112 NEUROMUSCULAR REEDUCATION: CPT

## 2024-12-16 PROCEDURE — 97140 MANUAL THERAPY 1/> REGIONS: CPT

## 2024-12-16 PROCEDURE — 97110 THERAPEUTIC EXERCISES: CPT

## 2024-12-16 NOTE — PROGRESS NOTES
Diagnosis:   LBP w L sided sciatica      Referring Provider: Cindi Rivero  Date of Evaluation:    11/19/2024    Precautions:  None Next MD visit:   none scheduled  Date of Surgery: n/a   Insurance Primary/Secondary: [place] / N/A     # Auth Visits: 5 through 12/31            Subjective: L sciatica still present. Made appointment for ortho exam on R knee.     Pain: 7/10      Objective:   Flexibility:   LE   Hip Flexor: R moderate tightness, L Moderate tightness  Hamstrings: R Mild tightness; L Mild tightness  Piriformis: R Mild tightness; L Moderate tightness  Quads: R Mild tightness; L Mild tightness  Gastroc-soleus: R Mild tightness; L Mild tightness        Assessment: Progressed TA to foam roll push/squeeze and ball push downs to increase core stability.       Goals:   (to be met in 10 visits)   Pt will improve transversus abdominis recruitment to perform proper isometric contraction without requiring verbal or tactile cuing to promote advancement of therex   Pt will demonstrate good understanding of proper posture and body mechanics to decrease pain and improve spinal safety   Pt will report improved symptom centralization and absence of radicular symptoms for 3 consecutive days to improve function with ADL   Pt will have decreased paraspinal mm tension to tolerate standing >45 minutes for work and home activities   Pt will demonstrate improved core strength to be able to perform lift/carry with <2/10 pain   Pt will be independent and compliant with comprehensive HEP to maintain progress achieved in PT         Plan: Progress core stability  Date: 11/21/2024  TX#: 2/5 Date: 12/2/2024                TX#: 3/5 Date: 12/9/2024                TX#: 4/5 Date:12/16/2024                 TX#: 5/5 Date:   Tx#: 6/   THERAPEUTIC EX  TM 2mph 8'  SL hip flexor stretch 3x30\"  Piriformis stretch 3x30\"  Glute stretch 3x30\"  Hamstring stretch 3x30\"  Clamshells 3x10 B   THERAPEUTIC EX  TM 2mph 8'  SL hip flexor stretch  3x30\"  Piriformis stretch 3x30\"  Glute stretch 3x30\"  Hamstring stretch 3x30\"  Clamshells 3x10 B THERAPEUTIC EX  TM 2mph 8'  SL hip flexor stretch 3x30\"  Piriformis stretch 3x30\"  Glute stretch 3x30\"  Hamstring stretch 3x30\"  Clamshells 3x10 B THERAPEUTIC EX  TM 2mph 8'  SL hip flexor stretch 3x30\"  Piriformis stretch 3x30\"  Glute stretch 3x30\"  Hamstring stretch 3x30\"  Clamshells 3x10 B    MANUAL THERAPY  STM L piriformis in R SL 13' MANUAL THERAPY  STM L piriformis in R SL 13' MANUAL THERAPY  STM L piriformis in R SL 13' MANUAL THERAPY  STM L piriformis in R SL 13'    NEURO RE-ED  TA instruction/anatomy   TA bracing 10\" 3x10 NEURO RE-ED  TA instruction/anatomy   TA bracing 10\" 3x10  TA bracing 10\" with hip add squeeze 3x10  TA bracing 10\" with hip abd/ER green 3x10 NEURO RE-ED  TA instruction/anatomy   TA bracing 10\" 3x10  TA bracing 10\" with hip add squeeze 3x10  TA bracing 10\" with hip abd/ER green 3x10  TA bracing with shoulder ext 3x10 grn NEURO RE-ED    TA bracing 10\" with hip add squeeze/push with foam roller 3x10  TA bracing 10\" with hip abd/ER blue 3x10  TA bracing with shoulder ext 3x10 blue  TA bracing with push down 2x10           HEP: Access Code: [email or web address]  URL: [email or web address]  Date: 11/19/2024  Prepared by: Jackson Pineda     Exercises  - Hip Flexor Stretch at Edge of Bed  - 1 x daily - 7 x weekly - 1 sets - 4 reps - 30\" hold  - Hip Flexor Stretch with Chair  - 1 x daily - 7 x weekly - 1 sets - 4 reps - 30\" hold  - Supine Piriformis Stretch  - 1 x daily - 7 x weekly - 1 sets - 4 reps - 30\" hold  - Supine Piriformis Stretch with Leg Straight  - 1 x daily - 7 x weekly - 1 sets - 4 reps - 30\" hold  - Supine Hamstring Stretch  - 1 x daily - 7 x weekly - 1 sets - 4 reps - 30\" hold  - Standing Hamstring Stretch with Step  - 1 x daily - 7 x weekly - 1 sets - 4 reps - 30\" hold  - Supine Lower Trunk Rotation  - 1 x daily - 7 x weekly - 1 sets - 4 reps - 30\" hold       Charges:  therapeutic ex 2, man therapy, neuro re-ed       Total Timed Treatment: 45 min  Total Treatment Time: 45 min

## 2024-12-16 NOTE — TELEPHONE ENCOUNTER
Patient is scheduled for right knee possible torn meniscus. Please advise if imaging is needed.  Future Appointments   Date Time Provider Department Center   12/16/2024  3:45 PM Jackson Pineda PT [facility] [place]   12/23/2024  4:30 PM Jackson Pineda PT [facility] [place]   1/2/2025 11:00 AM [id number]

## 2024-12-23 ENCOUNTER — OFFICE VISIT (OUTPATIENT)
Dept: PHYSICAL THERAPY | Age: 44
End: 2024-12-23
Attending: INTERNAL MEDICINE
Payer: COMMERCIAL

## 2024-12-23 PROCEDURE — 97110 THERAPEUTIC EXERCISES: CPT

## 2024-12-23 PROCEDURE — 97140 MANUAL THERAPY 1/> REGIONS: CPT

## 2024-12-23 PROCEDURE — 97112 NEUROMUSCULAR REEDUCATION: CPT

## 2024-12-23 NOTE — PROGRESS NOTES
Diagnosis:   LBP w L sided sciatica      Referring Provider: Cindi Rivero  Date of Evaluation:    11/19/2024    Precautions:  None Next MD visit:   none scheduled  Date of Surgery: n/a   Insurance Primary/Secondary: [place] / N/A     # Auth Visits: 5 through 12/31            Subjective: No changes since last visit.     Pain: 7/10      Objective:   Flexibility:   LE   Hip Flexor: R moderate tightness, L Moderate tightness  Hamstrings: R Mild tightness; L Mild tightness  Piriformis: R Mild tightness; L Moderate tightness  Quads: R Mild tightness; L Mild tightness  Gastroc-soleus: R Mild tightness; L Mild tightness        Assessment: Progressed to pallof press to increase core stability.       Goals:   (to be met in 10 visits)   Pt will improve transversus abdominis recruitment to perform proper isometric contraction without requiring verbal or tactile cuing to promote advancement of therex   Pt will demonstrate good understanding of proper posture and body mechanics to decrease pain and improve spinal safety   Pt will report improved symptom centralization and absence of radicular symptoms for 3 consecutive days to improve function with ADL   Pt will have decreased paraspinal mm tension to tolerate standing >45 minutes for work and home activities   Pt will demonstrate improved core strength to be able to perform lift/carry with <2/10 pain   Pt will be independent and compliant with comprehensive HEP to maintain progress achieved in PT         Plan:Add 4 pt core stability.   Date: 11/21/2024  TX#: 2/5 Date: 12/2/2024                TX#: 3/5 Date: 12/9/2024                TX#: 4/5 Date:12/16/2024                 TX#: 5/5 Date: 12/23/2024  Tx#: 6/10   THERAPEUTIC EX  TM 2mph 8'  SL hip flexor stretch 3x30\"  Piriformis stretch 3x30\"  Glute stretch 3x30\"  Hamstring stretch 3x30\"  Clamshells 3x10 B   THERAPEUTIC EX  TM 2mph 8'  SL hip flexor stretch 3x30\"  Piriformis stretch 3x30\"  Glute stretch 3x30\"  Hamstring  stretch 3x30\"  Clamshells 3x10 B THERAPEUTIC EX  TM 2mph 8'  SL hip flexor stretch 3x30\"  Piriformis stretch 3x30\"  Glute stretch 3x30\"  Hamstring stretch 3x30\"  Clamshells 3x10 B THERAPEUTIC EX  TM 2mph 8'  SL hip flexor stretch 3x30\"  Piriformis stretch 3x30\"  Glute stretch 3x30\"  Hamstring stretch 3x30\"  Clamshells 3x10 B THERAPEUTIC EX  TM 2mph 8'  SL hip flexor stretch 3x30\"  Piriformis stretch 3x30\"  Glute stretch 3x30\"  Hamstring stretch 3x30\"  Clamshells 3x10 B  Pallof press 10# 2x10 B   MANUAL THERAPY  STM L piriformis in R SL 13' MANUAL THERAPY  STM L piriformis in R SL 13' MANUAL THERAPY  STM L piriformis in R SL 13' MANUAL THERAPY  STM L piriformis in R SL 13' MANUAL THERAPY  STM L piriformis in R SL 13'   NEURO RE-ED  TA instruction/anatomy   TA bracing 10\" 3x10 NEURO RE-ED  TA instruction/anatomy   TA bracing 10\" 3x10  TA bracing 10\" with hip add squeeze 3x10  TA bracing 10\" with hip abd/ER green 3x10 NEURO RE-ED  TA instruction/anatomy   TA bracing 10\" 3x10  TA bracing 10\" with hip add squeeze 3x10  TA bracing 10\" with hip abd/ER green 3x10  TA bracing with shoulder ext 3x10 grn NEURO RE-ED    TA bracing 10\" with hip add squeeze/push with foam roller 3x10  TA bracing 10\" with hip abd/ER blue 3x10  TA bracing with shoulder ext 3x10 blue  TA bracing with push down 2x10 NEURO RE-ED    TA bracing 10\" with hip add squeeze/push with foam roller 3x10  TA bracing 10\" with hip abd/ER blue 3x10  TA bracing with shoulder ext 3x10 blue  TA bracing with push down 2x10          HEP: Access Code: [email or web address]  URL: [email or web address]  Date: 11/19/2024  Prepared by: Jackson Pineda     Exercises  - Hip Flexor Stretch at Edge of Bed  - 1 x daily - 7 x weekly - 1 sets - 4 reps - 30\" hold  - Hip Flexor Stretch with Chair  - 1 x daily - 7 x weekly - 1 sets - 4 reps - 30\" hold  - Supine Piriformis Stretch  - 1 x daily - 7 x weekly - 1 sets - 4 reps - 30\" hold  - Supine Piriformis Stretch with Leg Straight   - 1 x daily - 7 x weekly - 1 sets - 4 reps - 30\" hold  - Supine Hamstring Stretch  - 1 x daily - 7 x weekly - 1 sets - 4 reps - 30\" hold  - Standing Hamstring Stretch with Step  - 1 x daily - 7 x weekly - 1 sets - 4 reps - 30\" hold  - Supine Lower Trunk Rotation  - 1 x daily - 7 x weekly - 1 sets - 4 reps - 30\" hold       Charges: therapeutic ex 2, man therapy, neuro re-ed       Total Timed Treatment: 45 min  Total Treatment Time: 45 min

## 2024-12-30 RX ORDER — ALBUTEROL SULFATE 90 UG/1
2 INHALANT RESPIRATORY (INHALATION) EVERY 6 HOURS PRN
Qty: 8.5 G | Refills: 0 | Status: SHIPPED | OUTPATIENT
Start: 2024-12-30

## 2024-12-30 NOTE — TELEPHONE ENCOUNTER
Last refill was 10/19/23  Last OV w/Dr Rivero was 9/17/24  No future appointments scheduled with Dr Rivero.

## 2025-01-11 ENCOUNTER — PATIENT MESSAGE (OUTPATIENT)
Dept: FAMILY MEDICINE CLINIC | Facility: CLINIC | Age: 45
End: 2025-01-11

## 2025-01-13 RX ORDER — TIRZEPATIDE 12.5 MG/.5ML
12.5 INJECTION, SOLUTION SUBCUTANEOUS WEEKLY
Qty: 2 ML | Refills: 0 | Status: SHIPPED | OUTPATIENT
Start: 2025-01-13 | End: 2025-02-04

## 2025-01-21 ENCOUNTER — TELEPHONE (OUTPATIENT)
Dept: PHYSICAL THERAPY | Facility: HOSPITAL | Age: 45
End: 2025-01-21

## 2025-01-23 ENCOUNTER — APPOINTMENT (OUTPATIENT)
Dept: PHYSICAL THERAPY | Age: 45
End: 2025-01-23
Attending: INTERNAL MEDICINE
Payer: COMMERCIAL

## 2025-01-28 ENCOUNTER — APPOINTMENT (OUTPATIENT)
Dept: PHYSICAL THERAPY | Age: 45
End: 2025-01-28
Attending: INTERNAL MEDICINE
Payer: COMMERCIAL

## 2025-01-30 ENCOUNTER — OFFICE VISIT (OUTPATIENT)
Dept: PHYSICAL THERAPY | Age: 45
End: 2025-01-30
Attending: INTERNAL MEDICINE
Payer: COMMERCIAL

## 2025-01-30 PROCEDURE — 97140 MANUAL THERAPY 1/> REGIONS: CPT

## 2025-01-30 PROCEDURE — 97112 NEUROMUSCULAR REEDUCATION: CPT

## 2025-01-30 PROCEDURE — 97110 THERAPEUTIC EXERCISES: CPT

## 2025-01-30 NOTE — PROGRESS NOTES
Diagnosis:   LBP w L sided sciatica      Referring Provider: Cindi Rivero  Date of Evaluation:    11/19/2024    Precautions:  None Next MD visit:   none scheduled  Date of Surgery: n/a   Insurance Primary/Secondary: [place] / N/A     # Auth Visits: 5 through 12/31            Subjective: Hasn't been too bad the last few weeks.    Pain: 5/10      Objective:   Flexibility:   LE   Hip Flexor: R moderate tightness, L Moderate tightness  Hamstrings: R Mild tightness; L Mild tightness  Piriformis: R Mild tightness; L Moderate tightness  Quads: R Mild tightness; L Mild tightness  Gastroc-soleus: R Mild tightness; L Mild tightness        Assessment: Continued tone in piriformis region. Added 4pt arm/leg raise to increase core stability.       Goals:   (to be met in 10 visits)   Pt will improve transversus abdominis recruitment to perform proper isometric contraction without requiring verbal or tactile cuing to promote advancement of therex   Pt will demonstrate good understanding of proper posture and body mechanics to decrease pain and improve spinal safety   Pt will report improved symptom centralization and absence of radicular symptoms for 3 consecutive days to improve function with ADL   Pt will have decreased paraspinal mm tension to tolerate standing >45 minutes for work and home activities   Pt will demonstrate improved core strength to be able to perform lift/carry with <2/10 pain   Pt will be independent and compliant with comprehensive HEP to maintain progress achieved in PT         Plan: Add SB march  Date: 12/2/2024                TX#: 3/5 Date: 12/9/2024                TX#: 4/5 Date:12/16/2024                 TX#: 5/5 Date: 12/23/2024  Tx#: 6/10 Date: 1/30/2025  Tx#: 1/5   THERAPEUTIC EX  TM 2mph 8'  SL hip flexor stretch 3x30\"  Piriformis stretch 3x30\"  Glute stretch 3x30\"  Hamstring stretch 3x30\"  Clamshells 3x10 B THERAPEUTIC EX  TM 2mph 8'  SL hip flexor stretch 3x30\"  Piriformis stretch 3x30\"  Glute  stretch 3x30\"  Hamstring stretch 3x30\"  Clamshells 3x10 B THERAPEUTIC EX  TM 2mph 8'  SL hip flexor stretch 3x30\"  Piriformis stretch 3x30\"  Glute stretch 3x30\"  Hamstring stretch 3x30\"  Clamshells 3x10 B THERAPEUTIC EX  TM 2mph 8'  SL hip flexor stretch 3x30\"  Piriformis stretch 3x30\"  Glute stretch 3x30\"  Hamstring stretch 3x30\"  Clamshells 3x10 B  Pallof press 10# 2x10 B THERAPEUTIC EX  TM 2mph 8'  SL hip flexor stretch 3x30\"  Piriformis stretch 3x30\"  Glute stretch 3x30\"  Hamstring stretch 3x30\"  Clamshells 3x10 B  Pallof press 10# 2x10 B   MANUAL THERAPY  STM L piriformis in R SL 13' MANUAL THERAPY  STM L piriformis in R SL 13' MANUAL THERAPY  STM L piriformis in R SL 13' MANUAL THERAPY  STM L piriformis in R SL 13' MANUAL THERAPY  STM L piriformis in R SL 13'   NEURO RE-ED  TA instruction/anatomy   TA bracing 10\" 3x10  TA bracing 10\" with hip add squeeze 3x10  TA bracing 10\" with hip abd/ER green 3x10 NEURO RE-ED  TA instruction/anatomy   TA bracing 10\" 3x10  TA bracing 10\" with hip add squeeze 3x10  TA bracing 10\" with hip abd/ER green 3x10  TA bracing with shoulder ext 3x10 grn NEURO RE-ED    TA bracing 10\" with hip add squeeze/push with foam roller 3x10  TA bracing 10\" with hip abd/ER blue 3x10  TA bracing with shoulder ext 3x10 blue  TA bracing with push down 2x10 NEURO RE-ED    TA bracing 10\" with hip add squeeze/push with foam roller 3x10  TA bracing 10\" with hip abd/ER blue 3x10  TA bracing with shoulder ext 3x10 blue  TA bracing with push down 2x10 NEURO RE-ED    TA bracing 10\" with hip add squeeze/push with foam roller 2x10  4pt arm/leg ext 2x10 B  TA bracing with shoulder ext 3x10 blue  TA bracing with push down 2x10          HEP: Access Code: [email or web address]  URL: [email or web address]  Date: 11/19/2024  Prepared by: Jackson Pineda     Exercises  - Hip Flexor Stretch at Edge of Bed  - 1 x daily - 7 x weekly - 1 sets - 4 reps - 30\" hold  - Hip Flexor Stretch with Chair  - 1 x daily - 7 x  weekly - 1 sets - 4 reps - 30\" hold  - Supine Piriformis Stretch  - 1 x daily - 7 x weekly - 1 sets - 4 reps - 30\" hold  - Supine Piriformis Stretch with Leg Straight  - 1 x daily - 7 x weekly - 1 sets - 4 reps - 30\" hold  - Supine Hamstring Stretch  - 1 x daily - 7 x weekly - 1 sets - 4 reps - 30\" hold  - Standing Hamstring Stretch with Step  - 1 x daily - 7 x weekly - 1 sets - 4 reps - 30\" hold  - Supine Lower Trunk Rotation  - 1 x daily - 7 x weekly - 1 sets - 4 reps - 30\" hold       Charges: therapeutic ex 2, man therapy, neuro re-ed       Total Timed Treatment: 45 min  Total Treatment Time: 45 min

## 2025-02-04 ENCOUNTER — OFFICE VISIT (OUTPATIENT)
Dept: PHYSICAL THERAPY | Age: 45
End: 2025-02-04
Attending: INTERNAL MEDICINE
Payer: COMMERCIAL

## 2025-02-04 PROCEDURE — 97140 MANUAL THERAPY 1/> REGIONS: CPT

## 2025-02-04 PROCEDURE — 97110 THERAPEUTIC EXERCISES: CPT

## 2025-02-04 PROCEDURE — 97112 NEUROMUSCULAR REEDUCATION: CPT

## 2025-02-04 NOTE — PROGRESS NOTES
Diagnosis:   LBP w L sided sciatica      Referring Provider: Cindi Rivero  Date of Evaluation:    11/19/2024    Precautions:  None Next MD visit:   none scheduled  Date of Surgery: n/a   Insurance Primary/Secondary: [place] / N/A     # Auth Visits: 5 through 12/31            Subjective: Increased soreness after last session. I think it was the foam roll squeeze/push that aggravated it.     Pain: 5/10      Objective:   Flexibility:   LE   Hip Flexor: R moderate tightness, L Moderate tightness  Hamstrings: R Mild tightness; L Mild tightness  Piriformis: R Mild tightness; L Moderate tightness  Quads: R Mild tightness; L Mild tightness  Gastroc-soleus: R Mild tightness; L Mild tightness        Assessment: Increased tone and tenderness in the piriformis region, held foam roll push/squeeze. Added seated SB leg ext to increase core stability.       Goals:   (to be met in 10 visits)   Pt will improve transversus abdominis recruitment to perform proper isometric contraction without requiring verbal or tactile cuing to promote advancement of therex   Pt will demonstrate good understanding of proper posture and body mechanics to decrease pain and improve spinal safety   Pt will report improved symptom centralization and absence of radicular symptoms for 3 consecutive days to improve function with ADL   Pt will have decreased paraspinal mm tension to tolerate standing >45 minutes for work and home activities   Pt will demonstrate improved core strength to be able to perform lift/carry with <2/10 pain   Pt will be independent and compliant with comprehensive HEP to maintain progress achieved in PT         Plan: Add SB march  Date: 12/9/2024                TX#: 4/5 Date:12/16/2024                 TX#: 5/5 Date: 12/23/2024  Tx#: 6/10 Date: 1/30/2025  Tx#: 1/5 Date: 2/4/2025  Tx#: 2/5   THERAPEUTIC EX  TM 2mph 8'  SL hip flexor stretch 3x30\"  Piriformis stretch 3x30\"  Glute stretch 3x30\"  Hamstring stretch 3x30\"  Clamshells  3x10 B THERAPEUTIC EX  TM 2mph 8'  SL hip flexor stretch 3x30\"  Piriformis stretch 3x30\"  Glute stretch 3x30\"  Hamstring stretch 3x30\"  Clamshells 3x10 B THERAPEUTIC EX  TM 2mph 8'  SL hip flexor stretch 3x30\"  Piriformis stretch 3x30\"  Glute stretch 3x30\"  Hamstring stretch 3x30\"  Clamshells 3x10 B  Pallof press 10# 2x10 B THERAPEUTIC EX  TM 2mph 8'  SL hip flexor stretch 3x30\"  Piriformis stretch 3x30\"  Glute stretch 3x30\"  Hamstring stretch 3x30\"  Clamshells 3x10 B  Pallof press 10# 2x10 B THERAPEUTIC EX  TM 2mph 8'  SL hip flexor stretch 3x30\"  Piriformis stretch 3x30\"  Glute stretch 3x30\"  Hamstring stretch 3x30\"  Clamshells 3x10 B  Pallof press 10# 2x10 B   MANUAL THERAPY  STM L piriformis in R  13' MANUAL THERAPY  STM L piriformis in R  13' MANUAL THERAPY  STM L piriformis in R  13' MANUAL THERAPY  STM L piriformis in R  13' MANUAL THERAPY  STM L piriformis in R  13'   NEURO RE-ED  TA instruction/anatomy   TA bracing 10\" 3x10  TA bracing 10\" with hip add squeeze 3x10  TA bracing 10\" with hip abd/ER green 3x10  TA bracing with shoulder ext 3x10 grn NEURO RE-ED    TA bracing 10\" with hip add squeeze/push with foam roller 3x10  TA bracing 10\" with hip abd/ER blue 3x10  TA bracing with shoulder ext 3x10 blue  TA bracing with push down 2x10 NEURO RE-ED    TA bracing 10\" with hip add squeeze/push with foam roller 3x10  TA bracing 10\" with hip abd/ER blue 3x10  TA bracing with shoulder ext 3x10 blue  TA bracing with push down 2x10 NEURO RE-ED    TA bracing 10\" with hip add squeeze/push with foam roller 2x10  4pt arm/leg ext 2x10 B  TA bracing with shoulder ext 3x10 blue  TA bracing with push down 2x10 NEURO RE-ED  4pt arm/leg ext 2x10 B  TA bracing with shoulder ext 3x10 blue  TA bracing with push down 2x10  Seated SB leg ext on SB 3'          HEP: Access Code: [email or web address]  URL: [email or web address]  Date: 11/19/2024  Prepared by: Jackson Singh  - Hip Flexor Stretch at Edge  of Bed  - 1 x daily - 7 x weekly - 1 sets - 4 reps - 30\" hold  - Hip Flexor Stretch with Chair  - 1 x daily - 7 x weekly - 1 sets - 4 reps - 30\" hold  - Supine Piriformis Stretch  - 1 x daily - 7 x weekly - 1 sets - 4 reps - 30\" hold  - Supine Piriformis Stretch with Leg Straight  - 1 x daily - 7 x weekly - 1 sets - 4 reps - 30\" hold  - Supine Hamstring Stretch  - 1 x daily - 7 x weekly - 1 sets - 4 reps - 30\" hold  - Standing Hamstring Stretch with Step  - 1 x daily - 7 x weekly - 1 sets - 4 reps - 30\" hold  - Supine Lower Trunk Rotation  - 1 x daily - 7 x weekly - 1 sets - 4 reps - 30\" hold       Charges: therapeutic ex 2, man therapy, neuro re-ed       Total Timed Treatment: 45 min  Total Treatment Time: 45 min

## 2025-02-06 ENCOUNTER — OFFICE VISIT (OUTPATIENT)
Dept: ORTHOPEDICS CLINIC | Facility: CLINIC | Age: 45
End: 2025-02-06
Payer: COMMERCIAL

## 2025-02-06 VITALS — BODY MASS INDEX: 38.24 KG/M2 | HEIGHT: 64 IN | WEIGHT: 224 LBS

## 2025-02-06 DIAGNOSIS — M22.41 CHONDROMALACIA OF RIGHT PATELLA: Primary | ICD-10-CM

## 2025-02-06 DIAGNOSIS — M25.561 ANTERIOR KNEE PAIN, RIGHT: ICD-10-CM

## 2025-02-06 PROCEDURE — 99204 OFFICE O/P NEW MOD 45 MIN: CPT | Performed by: ORTHOPAEDIC SURGERY

## 2025-02-06 PROCEDURE — 3008F BODY MASS INDEX DOCD: CPT | Performed by: ORTHOPAEDIC SURGERY

## 2025-02-06 NOTE — PROGRESS NOTES
[facility] New Patient Consult      Chief Complaint   Patient presents with    Knee Pain     RIGHT KNEE  -Pain for about 6 months  -Denies any injury or past tx       HPI: The patient is a 44 year old female who presents for orthopedic consultation at the request of Dr. Cindi Rivero with complaints of progressive anterior right knee pain.  Less severe symptoms are noted on the left.  No recent injury is reported but the patient describes gradual onset in June of last year.  Stair use, getting up from the seated position, squatting and kneeling are typically exacerbating.  No obvious swelling, locking, kermit instability, redness or warmth is reported.  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  She is also undergoing treatment for her lower back with physical therapy.  Past Medical History:    Allergic rhinitis     History reviewed. No pertinent surgical history.  Current Outpatient Medications   Medication Sig Dispense Refill    albuterol 108 (90 Base) MCG/ACT Inhalation Aero Soln Inhale 2 puffs into the lungs every 6 (six) hours as needed for Wheezing. 8.5 g 0    montelukast 10 MG Oral Tab Take 1 tablet (10 mg total) by mouth nightly. 90 tablet 0    BREO ELLIPTA 200-25 MCG/ACT Inhalation Aerosol Powder, Breath Activated Inhale 1 puff into the lungs daily. (Patient not taking: Reported on 2/6/2025)      ADVAIR DISKUS 500-50 MCG/ACT Inhalation Aerosol Powder, Breath Activated Inhale 1 puff into the lungs 2 (two) times daily. 60 each 0     Allergies[1]  Family History   Problem Relation Age of Onset    Hypertension Father     Stroke Father     Heart Disorder Father 50        CAD    Other (Other) Father         sinha anuerysm    Hypertension Mother     Other (Other) Mother         alcohol     Social History     Occupational History    Not on file   Tobacco Use    Smoking status: Every Day     Current packs/day: 0.50     Types: Cigarettes    Smokeless tobacco: Never    Tobacco comments:      decreasing   Vaping Use    Vaping status: Never Used   Substance and Sexual Activity    Alcohol use: No    Drug use: No    Sexual activity: Not on file        ROS:  Complete ROS reviewed by me and non-contributory to the chief complaint except as mentioned above.    Physical Exam:    Ht 5' 4\" (1.626 m)   Wt 224 lb (101.6 kg)   LMP 06/11/2024   BMI 38.45 kg/m²   Constitutional: Well developed, well nourished 44 year old female  Psychological: NAD, alert and appropriate  Respiratory: Breathing comfortably on room air with RR of 12-14  Cardiac: Palpable distal pulses with pink warm extremities distally  Evaluation of the knees reveals neutral alignment with nonantalgic gait.  There is no visible swelling or discoloration.  Palpation reveals no warmth or effusion.  Range of motion reveals adequate full extension and 130 degrees of flexion.  There is mild patellofemoral click and crepitus during early flexion and late extension with a negative patellofemoral grind test.  Medial and lateral retinacular tissues are minimally tender.  Tibiofemoral joint lines are minimally tender to palpation medial more than lateral.  Both Guillermina's and Steinmann's tests are negative.  Varus, valgus, anterior and posterior stress tests are negative including a negative Lachman test.  No significant distal foot and ankle edema is noted.  Neurovascular status is intact distally.    Imaging: Multiple views of the right knee personally viewed, independently interpreted and radiology report read.  No acute bony findings are noted.  Joint spaces are preserved with no significant lateral tilt.    Assessment/Diagnoses:  Diagnoses and all orders for this visit:    Chondromalacia of right patella    Anterior knee pain, right      Plan: Referencing a plastic knee model, we reviewed imaging and exam findings with the patient.  Knee pain is likely due to patellofemoral syndrome and chondromalacia.  Although joint spaces are adequately preserved, I  explained that the durability of articular cartilage can degenerate over time.  This can result in intermittent mild swelling, clicking and occasional sharp pains.  Activities such as lunging, squatting, impact and excessive use of stairs or inclines can exacerbate symptoms.  We discussed at length initial conservative treatment recommendations including activity modification, weight loss, quadriceps stretching, oral and topical over-the-counter anti-inflammatories, and icing.  The patient was counseled on specific quadriceps stretching exercises which were also demonstrated.  These will be better tolerated after moist heat such as after a warm shower or bath.  Cross training with less intense forms of exercise including swimming, cycling and elliptical should be better tolerated.  All questions were answered and the patient appeared satisfied.  Follow-up is recommended if there is any progression in pain, mechanical catching, locking, instability or new swelling.  Further imaging with an MRI may be indicated if these symptoms arise.        Lourdes Sotomayor MD, [facility]  Orthopaedic Surgery   Sports Medicine/Knee and Shoulder  [facility]  t: [phone]  f: [phone]           This document was partially prepared using Dragon Medical voice recognition software.  Although every attempt is made to correct errors during dictation, discrepancies may still exist.           [1]   Allergies  Allergen Reactions    Ciprofloxacin OTHER (SEE COMMENTS)     Felt \"loopy\"; dizzy. Insomnia, nightmares, nausea

## 2025-02-10 ENCOUNTER — OFFICE VISIT (OUTPATIENT)
Dept: PHYSICAL THERAPY | Age: 45
End: 2025-02-10
Attending: INTERNAL MEDICINE
Payer: COMMERCIAL

## 2025-02-10 PROCEDURE — 97112 NEUROMUSCULAR REEDUCATION: CPT

## 2025-02-10 PROCEDURE — 97140 MANUAL THERAPY 1/> REGIONS: CPT

## 2025-02-10 PROCEDURE — 97110 THERAPEUTIC EXERCISES: CPT

## 2025-02-10 NOTE — PROGRESS NOTES
Diagnosis:   LBP w L sided sciatica      Referring Provider: Cindi Rivero  Date of Evaluation:    11/19/2024    Precautions:  None Next MD visit:   none scheduled  Date of Surgery: n/a   Insurance Primary/Secondary[place] / N/A     # Auth Visits: 5 through 3/31            Subjective: Not too bad today    Pain: 5/10      Objective:   Flexibility:   LE   Hip Flexor: R moderate tightness, L Moderate tightness  Hamstrings: R Mild tightness; L Mild tightness  Piriformis: R Mild tightness; L Moderate tightness  Quads: R Mild tightness; L Mild tightness  Gastroc-soleus: R Mild tightness; L Mild tightness        Assessment: Good tolerance to all exercises today. Improving flexibility in B LE.        Goals:   (to be met in 10 visits)   Pt will improve transversus abdominis recruitment to perform proper isometric contraction without requiring verbal or tactile cuing to promote advancement of therex   Pt will demonstrate good understanding of proper posture and body mechanics to decrease pain and improve spinal safety   Pt will report improved symptom centralization and absence of radicular symptoms for 3 consecutive days to improve function with ADL   Pt will have decreased paraspinal mm tension to tolerate standing >45 minutes for work and home activities   Pt will demonstrate improved core strength to be able to perform lift/carry with <2/10 pain   Pt will be independent and compliant with comprehensive HEP to maintain progress achieved in PT         Plan: Progress core stability  Date: 12/23/2024  Tx#: 6/10 Date: 1/30/2025  Tx#: 1/5 Date: 2/4/2025  Tx#: 2/5 Date: 2/10/2025  Tx#: 3/5   THERAPEUTIC EX  TM 2mph 8'  SL hip flexor stretch 3x30\"  Piriformis stretch 3x30\"  Glute stretch 3x30\"  Hamstring stretch 3x30\"  Clamshells 3x10 B  Pallof press 10# 2x10 B THERAPEUTIC EX  TM 2mph 8'  SL hip flexor stretch 3x30\"  Piriformis stretch 3x30\"  Glute stretch 3x30\"  Hamstring stretch 3x30\"  Clamshells 3x10 B  Pallof press 10#  2x10 B THERAPEUTIC EX  TM 2mph 8'  SL hip flexor stretch 3x30\"  Piriformis stretch 3x30\"  Glute stretch 3x30\"  Hamstring stretch 3x30\"  Clamshells 3x10 B  Pallof press 10# 2x10 B THERAPEUTIC EX  TM 2mph 8'  SL hip flexor stretch 3x30\"  Piriformis stretch 3x30\"  Glute stretch 3x30\"  Hamstring stretch 3x30\"  Clamshells 3x10 B  Pallof press 10# 2x10 B   MANUAL THERAPY  STM L piriformis in R SL 13' MANUAL THERAPY  STM L piriformis in R SL 13' MANUAL THERAPY  STM L piriformis in R SL 13' MANUAL THERAPY  STM L piriformis in R SL 13'   NEURO RE-ED    TA bracing 10\" with hip add squeeze/push with foam roller 3x10  TA bracing 10\" with hip abd/ER blue 3x10  TA bracing with shoulder ext 3x10 blue  TA bracing with push down 2x10 NEURO RE-ED    TA bracing 10\" with hip add squeeze/push with foam roller 2x10  4pt arm/leg ext 2x10 B  TA bracing with shoulder ext 3x10 blue  TA bracing with push down 2x10 NEURO RE-ED  4pt arm/leg ext 2x10 B  TA bracing with shoulder ext 3x10 blue  TA bracing with push down 2x10  Seated SB leg ext on SB 3' NEURO RE-ED  4pt arm/leg ext 2x10 B  TA bracing with shoulder ext 3x10 blue  TA bracing with push down 2x10  Seated SB leg ext on SB 3'         HEP: Access Code: [email or web address]  URL: [email or web address]  Date: 11/19/2024  Prepared by: Jackson Pineda     Exercises  - Hip Flexor Stretch at Edge of Bed  - 1 x daily - 7 x weekly - 1 sets - 4 reps - 30\" hold  - Hip Flexor Stretch with Chair  - 1 x daily - 7 x weekly - 1 sets - 4 reps - 30\" hold  - Supine Piriformis Stretch  - 1 x daily - 7 x weekly - 1 sets - 4 reps - 30\" hold  - Supine Piriformis Stretch with Leg Straight  - 1 x daily - 7 x weekly - 1 sets - 4 reps - 30\" hold  - Supine Hamstring Stretch  - 1 x daily - 7 x weekly - 1 sets - 4 reps - 30\" hold  - Standing Hamstring Stretch with Step  - 1 x daily - 7 x weekly - 1 sets - 4 reps - 30\" hold  - Supine Lower Trunk Rotation  - 1 x daily - 7 x weekly - 1 sets - 4 reps - 30\" hold        Charges: therapeutic ex 2, man therapy, neuro re-ed       Total Timed Treatment: 45 min  Total Treatment Time: 45 min

## 2025-02-19 ENCOUNTER — OFFICE VISIT (OUTPATIENT)
Dept: PHYSICAL THERAPY | Age: 45
End: 2025-02-19
Attending: INTERNAL MEDICINE
Payer: COMMERCIAL

## 2025-02-19 PROCEDURE — 97110 THERAPEUTIC EXERCISES: CPT

## 2025-02-19 PROCEDURE — 97112 NEUROMUSCULAR REEDUCATION: CPT

## 2025-02-19 PROCEDURE — 97140 MANUAL THERAPY 1/> REGIONS: CPT

## 2025-02-19 NOTE — PROGRESS NOTES
Diagnosis:   LBP w L sided sciatica      Referring Provider: Cindi Rivero  Date of Evaluation:    11/19/2024    Precautions:  None Next MD visit:   none scheduled  Date of Surgery: n/a   Insurance Primary/Secondary[place] / N/A     # Auth Visits: 5 through 3/31            Subjective: Continue to see improvement.     Pain: 3/10      Objective:   Flexibility:   LE   Hip Flexor: R moderate tightness, L Moderate tightness  Hamstrings: R Mild tightness; L Mild tightness  Piriformis: R Mild tightness; L Moderate tightness  Quads: R Mild tightness; L Mild tightness  Gastroc-soleus: R Mild tightness; L Mild tightness        Assessment: Continued gains in flexibility. Improved endurance with hip and core strength activity.       Goals:   (to be met in 10 visits)   Pt will improve transversus abdominis recruitment to perform proper isometric contraction without requiring verbal or tactile cuing to promote advancement of therex   Pt will demonstrate good understanding of proper posture and body mechanics to decrease pain and improve spinal safety   Pt will report improved symptom centralization and absence of radicular symptoms for 3 consecutive days to improve function with ADL   Pt will have decreased paraspinal mm tension to tolerate standing >45 minutes for work and home activities   Pt will demonstrate improved core strength to be able to perform lift/carry with <2/10 pain   Pt will be independent and compliant with comprehensive HEP to maintain progress achieved in PT         Plan: Cont x1  Date: 1/30/2025  Tx#: 1/5 Date: 2/4/2025  Tx#: 2/5 Date: 2/10/2025  Tx#: 3/5 Date: 2/19/2025  Tx#: 4/5   THERAPEUTIC EX  TM 2mph 8'  SL hip flexor stretch 3x30\"  Piriformis stretch 3x30\"  Glute stretch 3x30\"  Hamstring stretch 3x30\"  Clamshells 3x10 B  Pallof press 10# 2x10 B THERAPEUTIC EX  TM 2mph 8'  SL hip flexor stretch 3x30\"  Piriformis stretch 3x30\"  Glute stretch 3x30\"  Hamstring stretch 3x30\"  Clamshells 3x10 B  Pallof  press 10# 2x10 B THERAPEUTIC EX  TM 2mph 8'  SL hip flexor stretch 3x30\"  Piriformis stretch 3x30\"  Glute stretch 3x30\"  Hamstring stretch 3x30\"  Clamshells 3x10 B  Pallof press 10# 2x10 B THERAPEUTIC EX  TM 2mph 8'  SL hip flexor stretch 3x30\"  Piriformis stretch 3x30\"  Glute stretch 3x30\"  Hamstring stretch 3x30\"  Clamshells 3x10 B  Pallof press 10# 2x10 B   MANUAL THERAPY  STM L piriformis in R SL 13' MANUAL THERAPY  STM L piriformis in R SL 13' MANUAL THERAPY  STM L piriformis in R SL 13' MANUAL THERAPY  STM L piriformis in R SL 13'   NEURO RE-ED    TA bracing 10\" with hip add squeeze/push with foam roller 2x10  4pt arm/leg ext 2x10 B  TA bracing with shoulder ext 3x10 blue  TA bracing with push down 2x10 NEURO RE-ED  4pt arm/leg ext 2x10 B  TA bracing with shoulder ext 3x10 blue  TA bracing with push down 2x10  Seated SB leg ext on SB 3' NEURO RE-ED  4pt arm/leg ext 2x10 B  TA bracing with shoulder ext 3x10 blue  TA bracing with push down 2x10  Seated SB leg ext on SB 3' NEURO RE-ED  4pt arm/leg ext 2x10 B  TA bracing with shoulder ext 3x10 blue  TA bracing with push down 2x10  Seated SB leg ext on SB 3'         HEP: Access Code: [email or web address]  URL: [email or web address]  Date: 11/19/2024  Prepared by: Jackson Pineda     Exercises  - Hip Flexor Stretch at Edge of Bed  - 1 x daily - 7 x weekly - 1 sets - 4 reps - 30\" hold  - Hip Flexor Stretch with Chair  - 1 x daily - 7 x weekly - 1 sets - 4 reps - 30\" hold  - Supine Piriformis Stretch  - 1 x daily - 7 x weekly - 1 sets - 4 reps - 30\" hold  - Supine Piriformis Stretch with Leg Straight  - 1 x daily - 7 x weekly - 1 sets - 4 reps - 30\" hold  - Supine Hamstring Stretch  - 1 x daily - 7 x weekly - 1 sets - 4 reps - 30\" hold  - Standing Hamstring Stretch with Step  - 1 x daily - 7 x weekly - 1 sets - 4 reps - 30\" hold  - Supine Lower Trunk Rotation  - 1 x daily - 7 x weekly - 1 sets - 4 reps - 30\" hold       Charges: therapeutic ex 2, man therapy,  neuro re-ed       Total Timed Treatment: 45 min  Total Treatment Time: 45 min

## 2025-03-05 ENCOUNTER — APPOINTMENT (OUTPATIENT)
Dept: PHYSICAL THERAPY | Age: 45
End: 2025-03-05
Attending: INTERNAL MEDICINE
Payer: COMMERCIAL

## 2025-03-05 PROCEDURE — 97140 MANUAL THERAPY 1/> REGIONS: CPT

## 2025-03-05 PROCEDURE — 97110 THERAPEUTIC EXERCISES: CPT

## 2025-03-05 PROCEDURE — 97112 NEUROMUSCULAR REEDUCATION: CPT

## 2025-03-05 NOTE — PROGRESS NOTES
Discharge Summary  Pt has attended 10 visits in Physical Therapy.    Diagnosis:   LBP w L sided sciatica      Referring Provider: Cindi Rivero  Date of Evaluation:    11/19/2024    Precautions:  None Next MD visit:   none scheduled  Date of Surgery: n/a   Insurance Primary/Secondary[place] / N/A     # Auth Visits: 5 through 3/31            Subjective: Feeling much better overall. More limited by R knee than LBP.    Pain: 2/10      Objective:   Flexibility:   LE   Hip Flexor: R Mild  tightness, L Mild tightness  Hamstrings: R Mild tightness; L Mild tightness  Piriformis: R Mild tightness; L Moderate tightness  Quads: R Mild tightness; L Mild tightness  Gastroc-soleus: R Mild tightness; L Mild tightness   MMT: 5/5 strength throughout B hip/knee.     Assessment: Patient comfortable and compliant with HEP. Should see continued gains with consistent HEP participation.       Goals:   (to be met in 10 visits)   Pt will improve transversus abdominis recruitment to perform proper isometric contraction without requiring verbal or tactile cuing to promote advancement of therex -Met  Pt will demonstrate good understanding of proper posture and body mechanics to decrease pain and improve spinal safety -Met  Pt will report improved symptom centralization and absence of radicular symptoms for 3 consecutive days to improve function with ADL -Met  Pt will have decreased paraspinal mm tension to tolerate standing >45 minutes for work and home activities -Met  Pt will demonstrate improved core strength to be able to perform lift/carry with <2/10 pain -Met  Pt will be independent and compliant with comprehensive HEP to maintain progress achieved in PT  -Met     Post LEFS Score  Post LEFS Score: 95 % (3/5/2025  4:10 PM)    95 % improvement    Plan: DC to HEP     Patient/Family/Caregiver was advised of these findings, precautions, and treatment options and has agreed to actively participate in planning and for this course of  care.    Thank you for your referral. If you have any questions, please contact me at Dept: [phone].    Sincerely,  Electronically signed by therapist: Jackson Pineda PT     Physician's certification required:  No  Please co-sign or sign and return this letter via fax as soon as possible to [phone].   I certify the need for these services furnished under this plan of treatment and while under my care.    X___________________________________________________ Date____________________    Certification From: 3/5/2025  To:6/3/2025    Date: 2/4/2025  Tx#: 2/5 Date: 2/10/2025  Tx#: 3/5 Date: 2/19/2025  Tx#: 4/5 Date: 3/5/2025  Tx#: 5/5   THERAPEUTIC EX  TM 2mph 8'  SL hip flexor stretch 3x30\"  Piriformis stretch 3x30\"  Glute stretch 3x30\"  Hamstring stretch 3x30\"  Clamshells 3x10 B  Pallof press 10# 2x10 B THERAPEUTIC EX  TM 2mph 8'  SL hip flexor stretch 3x30\"  Piriformis stretch 3x30\"  Glute stretch 3x30\"  Hamstring stretch 3x30\"  Clamshells 3x10 B  Pallof press 10# 2x10 B THERAPEUTIC EX  TM 2mph 8'  SL hip flexor stretch 3x30\"  Piriformis stretch 3x30\"  Glute stretch 3x30\"  Hamstring stretch 3x30\"  Clamshells 3x10 B  Pallof press 10# 2x10 B THERAPEUTIC EX  TM 2mph 8'  SL hip flexor stretch 3x30\"  Piriformis stretch 3x30\"  Glute stretch 3x30\"  Hamstring stretch 3x30\"  Clamshells 3x10 B  Pallof press 10# 2x10 B   MANUAL THERAPY  STM L piriformis in R SL 13' MANUAL THERAPY  STM L piriformis in R SL 13' MANUAL THERAPY  STM L piriformis in R SL 13' MANUAL THERAPY  STM L piriformis in R SL 13'   NEURO RE-ED  4pt arm/leg ext 2x10 B  TA bracing with shoulder ext 3x10 blue  TA bracing with push down 2x10  Seated SB leg ext on SB 3' NEURO RE-ED  4pt arm/leg ext 2x10 B  TA bracing with shoulder ext 3x10 blue  TA bracing with push down 2x10  Seated SB leg ext on SB 3' NEURO RE-ED  4pt arm/leg ext 2x10 B  TA bracing with shoulder ext 3x10 blue  TA bracing with push down 2x10  Seated SB leg ext on SB 3' NEURO RE-ED  4pt  arm/leg ext 2x10 B  TA bracing with shoulder ext 3x10 blue  TA bracing with push down 2x10  Seated SB leg ext on SB 3'         HEP: Access Code: [email or web address]  URL: [email or web address]  Date: 11/19/2024  Prepared by: Jackson Pineda     Exercises  - Hip Flexor Stretch at Edge of Bed  - 1 x daily - 7 x weekly - 1 sets - 4 reps - 30\" hold  - Hip Flexor Stretch with Chair  - 1 x daily - 7 x weekly - 1 sets - 4 reps - 30\" hold  - Supine Piriformis Stretch  - 1 x daily - 7 x weekly - 1 sets - 4 reps - 30\" hold  - Supine Piriformis Stretch with Leg Straight  - 1 x daily - 7 x weekly - 1 sets - 4 reps - 30\" hold  - Supine Hamstring Stretch  - 1 x daily - 7 x weekly - 1 sets - 4 reps - 30\" hold  - Standing Hamstring Stretch with Step  - 1 x daily - 7 x weekly - 1 sets - 4 reps - 30\" hold  - Supine Lower Trunk Rotation  - 1 x daily - 7 x weekly - 1 sets - 4 reps - 30\" hold       Charges: therapeutic ex 2, man therapy, neuro re-ed       Total Timed Treatment: 45 min  Total Treatment Time: 45 min

## 2025-03-26 NOTE — TELEPHONE ENCOUNTER
No care due was identified.  [facility] Embedded Care Due Messages. Reference number: [id number].   3/26/2025 12:27:12 AM CDT   Pt said that her fluticasone furoate-vilanterol (BREO ELLIPTA) 200-25 MCG/ACT Inhalation Aerosol Powder, Breath Activated, keeps getting denied by insurance. Pt said that she spoke with her insurance said that a new prescription needs to be resubmitted with the new directions stating 2 times a day.

## 2025-04-03 ENCOUNTER — PATIENT MESSAGE (OUTPATIENT)
Dept: FAMILY MEDICINE CLINIC | Facility: CLINIC | Age: 45
End: 2025-04-03

## 2025-04-04 RX ORDER — TIRZEPATIDE 15 MG/.5ML
15 INJECTION, SOLUTION SUBCUTANEOUS WEEKLY
Qty: 2 ML | Refills: 3 | Status: SHIPPED | OUTPATIENT
Start: 2025-04-04 | End: 2025-04-26

## 2025-04-04 NOTE — TELEPHONE ENCOUNTER
See My Chart message requesting increase in Zepbound    LOV  9-17-24    LAST LAB  9-5-23    LAST RX  3-12-5  12.5 mg  2 ml    Next OV  No future appointments.      (Request for refill on [place] sent through telephone encounter in his chart)

## 2025-04-22 ENCOUNTER — OFFICE VISIT (OUTPATIENT)
Dept: FAMILY MEDICINE CLINIC | Facility: CLINIC | Age: 45
End: 2025-04-22
Payer: COMMERCIAL

## 2025-04-22 VITALS
DIASTOLIC BLOOD PRESSURE: 70 MMHG | SYSTOLIC BLOOD PRESSURE: 110 MMHG | TEMPERATURE: 98 F | BODY MASS INDEX: 38 KG/M2 | HEART RATE: 72 BPM | WEIGHT: 221.38 LBS | OXYGEN SATURATION: 97 % | RESPIRATION RATE: 16 BRPM

## 2025-04-22 DIAGNOSIS — Z71.6 ENCOUNTER FOR TOBACCO USE CESSATION COUNSELING: ICD-10-CM

## 2025-04-22 DIAGNOSIS — Z00.00 WELL ADULT EXAM: Primary | ICD-10-CM

## 2025-04-22 PROCEDURE — 99396 PREV VISIT EST AGE 40-64: CPT | Performed by: INTERNAL MEDICINE

## 2025-04-22 PROCEDURE — 3074F SYST BP LT 130 MM HG: CPT | Performed by: INTERNAL MEDICINE

## 2025-04-22 PROCEDURE — 3078F DIAST BP <80 MM HG: CPT | Performed by: INTERNAL MEDICINE

## 2025-04-22 RX ORDER — CETIRIZINE HYDROCHLORIDE 10 MG/1
10 TABLET ORAL DAILY
Qty: 90 TABLET | Refills: 0 | Status: SHIPPED | OUTPATIENT
Start: 2025-04-22 | End: 2025-07-21

## 2025-04-22 RX ORDER — VARENICLINE TARTRATE 0.5 MG/1
0.5 TABLET, FILM COATED ORAL 2 TIMES DAILY
Qty: 60 TABLET | Refills: 0 | Status: SHIPPED | OUTPATIENT
Start: 2025-04-22 | End: 2025-05-22

## 2025-04-22 NOTE — PROGRESS NOTES
HPI:   Madina Gonzales is a 44 year old female who presents for a complete physical exam. Symptoms: denies discharge, itching, burning or dysuria, periods are regular. Patient complains of nothing.  She has been using a GLP1 for 9 months and has lost 20 pounds and stopped; BMI goal not reached 38 today. She eats very little, reports that she is active everyday, she smokes some, about 3 cigarettes a day and is ready to quit.       There is no immunization history on file for this patient.  Wt Readings from Last 6 Encounters:   04/22/25 221 lb 6 oz (100.4 kg)   02/06/25 224 lb (101.6 kg)   09/17/24 224 lb 4 oz (101.7 kg)   06/27/24 237 lb 4 oz (107.6 kg)   11/03/23 234 lb 12.8 oz (106.5 kg)   10/25/23 235 lb (106.6 kg)     Body mass index is 38 kg/m².     Lab Results   Component Value Date    GLU 98 09/05/2023     (H) 01/18/2022    GLU 88 02/05/2020     Lab Results   Component Value Date    CHOLEST 198 09/05/2023    CHOLEST 223 (H) 01/18/2022    CHOLEST 233 (H) 02/05/2020     Lab Results   Component Value Date    HDL 44 09/05/2023    HDL 50 01/18/2022    HDL 55 02/05/2020     Lab Results   Component Value Date     (H) 09/05/2023     (H) 01/18/2022     (H) 02/05/2020     Lab Results   Component Value Date    AST 17 09/05/2023    AST 12 (L) 01/18/2022    AST 11 (L) 02/05/2020     Lab Results   Component Value Date    ALT 21 09/05/2023    ALT 18 01/18/2022    ALT 21 02/05/2020       Current Medications[1]   Past Medical History[2]   Past Surgical History[3]   Family History[4]   Social History:   Short Social Hx on File[5]   : yes. Children: yes.   Exercise: three times per week.  Diet: low carb diet     REVIEW OF SYSTEMS:   GENERAL: feels well otherwise  SKIN: denies any unusual skin lesions  EYES:denies blurred vision or double vision  HEENT: denies nasal congestion, sinus pain or ST  LUNGS: denies shortness of breath with exertion  CARDIOVASCULAR: denies chest pain on exertion  GI:  denies abdominal pain,denies heartburn  : denies dysuria, vaginal discharge or itching,periods regular   MUSCULOSKELETAL: denies back pain  NEURO: denies headaches  PSYCHE: denies depression or anxiety  HEMATOLOGIC: denies hx of anemia  ENDOCRINE: denies thyroid history  ALL/ASTHMA: denies hx of allergy or asthma    EXAM:   /70   Pulse 72   Temp 98 °F (36.7 °C) (Temporal)   Resp 16   Wt 221 lb 6 oz (100.4 kg)   LMP 04/07/2025   SpO2 97%   BMI 38.00 kg/m²   Body mass index is 38 kg/m².   GENERAL: well developed, well nourished,in no apparent distress  SKIN: no rashes,no suspicious lesions  HEENT: atraumatic, normocephalic,ears and throat are clear  EYES:PERRLA, EOMI, normal optic disk,conjunctiva are clear  NECK: supple,no adenopathy,no bruits  CHEST: no chest tenderness  BREAST: deferred  LUNGS: clear to auscultation  CARDIO: RRR without murmur  GI: good BS's,no masses, HSM or tenderness  :deferred  RECTAL:good rectal tone, stool is OB negative  MUSCULOSKELETAL: back is not tender,FROM of the back  EXTREMITIES: no cyanosis, clubbing or edema  NEURO: Oriented times three,cranial nerves are intact,motor and sensory are grossly intact    ASSESSMENT AND PLAN:   Madina Gonzales is a 44 year old female who presents for a complete physical exam.  Pap and pelvic done. Order put in for mammogram. Health maintenance, will check fasting Lipids, CMP, TSH, cortisol, and CBC. Pt' s weight is Body mass index is 38 kg/m²., recommended low fat diet and aerobic exercise 30 minutes three times weekly.  The patient indicates understanding of these issues and agrees to the plan.  The patient is asked to return for CPX in 1 year.         [1]   Current Outpatient Medications   Medication Sig Dispense Refill    cetirizine 10 MG Oral Tab Take 1 tablet (10 mg total) by mouth daily. 90 tablet 0    Tirzepatide-Weight Management (ZEPBOUND) 15 MG/0.5ML Subcutaneous Solution Auto-injector Inject 15 mg into the skin once a week for  4 doses. 2 mL 3    albuterol 108 (90 Base) MCG/ACT Inhalation Aero Soln Inhale 2 puffs into the lungs every 6 (six) hours as needed for Wheezing. 8.5 g 0    montelukast 10 MG Oral Tab Take 1 tablet (10 mg total) by mouth nightly. 90 tablet 0    ADVAIR DISKUS 500-50 MCG/ACT Inhalation Aerosol Powder, Breath Activated Inhale 1 puff into the lungs 2 (two) times daily. 60 each 0    BREO ELLIPTA 200-25 MCG/ACT Inhalation Aerosol Powder, Breath Activated Inhale 1 puff into the lungs daily. (Patient not taking: Reported on 2/6/2025)     [2]   Past Medical History:   Allergic rhinitis   [3] No past surgical history on file.  [4]   Family History  Problem Relation Age of Onset    Hypertension Father     Stroke Father     Heart Disorder Father 50        CAD    Other (Other) Father         sinha anuerysm    Hypertension Mother     Other (Other) Mother         alcohol   [5]   Social History  Socioeconomic History    Marital status:    Tobacco Use    Smoking status: Every Day     Current packs/day: 0.50     Types: Cigarettes    Smokeless tobacco: Never    Tobacco comments:     decreasing   Vaping Use    Vaping status: Never Used   Substance and Sexual Activity    Alcohol use: No    Drug use: No     Social Drivers of Health      Received from [facility]    Housing Stability

## 2025-04-27 ENCOUNTER — PATIENT MESSAGE (OUTPATIENT)
Dept: FAMILY MEDICINE CLINIC | Facility: CLINIC | Age: 45
End: 2025-04-27

## 2025-04-30 ENCOUNTER — LABORATORY ENCOUNTER (OUTPATIENT)
Dept: LAB | Age: 45
End: 2025-04-30
Attending: INTERNAL MEDICINE
Payer: COMMERCIAL

## 2025-04-30 DIAGNOSIS — Z00.00 WELL ADULT EXAM: ICD-10-CM

## 2025-04-30 LAB
ALBUMIN SERPL-MCNC: 4.9 G/DL (ref 3.2–4.8)
ALBUMIN/GLOB SERPL: 2.1 {RATIO} (ref 1–2)
ALP LIVER SERPL-CCNC: 55 U/L (ref 37–98)
ALT SERPL-CCNC: 9 U/L (ref 10–49)
ANION GAP SERPL CALC-SCNC: 7 MMOL/L (ref 0–18)
AST SERPL-CCNC: 14 U/L (ref ?–34)
BASOPHILS # BLD AUTO: 0.03 X10(3) UL (ref 0–0.2)
BASOPHILS NFR BLD AUTO: 0.5 %
BILIRUB SERPL-MCNC: 0.4 MG/DL (ref 0.3–1.2)
BUN BLD-MCNC: 14 MG/DL (ref 9–23)
CALCIUM BLD-MCNC: 9.9 MG/DL (ref 8.7–10.6)
CHLORIDE SERPL-SCNC: 105 MMOL/L (ref 98–112)
CHOLEST SERPL-MCNC: 219 MG/DL (ref ?–200)
CO2 SERPL-SCNC: 26 MMOL/L (ref 21–32)
CORTIS SERPL-MCNC: 19.7 UG/DL
CREAT BLD-MCNC: 0.99 MG/DL (ref 0.55–1.02)
EGFRCR SERPLBLD CKD-EPI 2021: 73 ML/MIN/1.73M2 (ref 60–?)
EOSINOPHIL # BLD AUTO: 0.27 X10(3) UL (ref 0–0.7)
EOSINOPHIL NFR BLD AUTO: 4.1 %
ERYTHROCYTE [DISTWIDTH] IN BLOOD BY AUTOMATED COUNT: 13.2 %
FASTING PATIENT LIPID ANSWER: YES
FASTING STATUS PATIENT QL REPORTED: YES
GLOBULIN PLAS-MCNC: 2.3 G/DL (ref 2–3.5)
GLUCOSE BLD-MCNC: 83 MG/DL (ref 70–99)
HCT VFR BLD AUTO: 41 % (ref 35–48)
HDLC SERPL-MCNC: 56 MG/DL (ref 40–59)
HGB BLD-MCNC: 13.9 G/DL (ref 12–16)
IMM GRANULOCYTES # BLD AUTO: 0.02 X10(3) UL (ref 0–1)
IMM GRANULOCYTES NFR BLD: 0.3 %
LDLC SERPL CALC-MCNC: 142 MG/DL (ref ?–100)
LYMPHOCYTES # BLD AUTO: 1.98 X10(3) UL (ref 1–4)
LYMPHOCYTES NFR BLD AUTO: 30.2 %
MCH RBC QN AUTO: 30.8 PG (ref 26–34)
MCHC RBC AUTO-ENTMCNC: 33.9 G/DL (ref 31–37)
MCV RBC AUTO: 90.7 FL (ref 80–100)
MONOCYTES # BLD AUTO: 0.61 X10(3) UL (ref 0.1–1)
MONOCYTES NFR BLD AUTO: 9.3 %
NEUTROPHILS # BLD AUTO: 3.65 X10 (3) UL (ref 1.5–7.7)
NEUTROPHILS # BLD AUTO: 3.65 X10(3) UL (ref 1.5–7.7)
NEUTROPHILS NFR BLD AUTO: 55.6 %
NONHDLC SERPL-MCNC: 163 MG/DL (ref ?–130)
OSMOLALITY SERPL CALC.SUM OF ELEC: 286 MOSM/KG (ref 275–295)
PLATELET # BLD AUTO: 314 10(3)UL (ref 150–450)
POTASSIUM SERPL-SCNC: 4.3 MMOL/L (ref 3.5–5.1)
PROT SERPL-MCNC: 7.2 G/DL (ref 5.7–8.2)
RBC # BLD AUTO: 4.52 X10(6)UL (ref 3.8–5.3)
SODIUM SERPL-SCNC: 138 MMOL/L (ref 136–145)
T4 FREE SERPL-MCNC: 1.1 NG/DL (ref 0.8–1.7)
TRIGL SERPL-MCNC: 117 MG/DL (ref 30–149)
TSI SER-ACNC: 1.65 UIU/ML (ref 0.55–4.78)
VLDLC SERPL CALC-MCNC: 22 MG/DL (ref 0–30)
WBC # BLD AUTO: 6.6 X10(3) UL (ref 4–11)

## 2025-04-30 PROCEDURE — 85025 COMPLETE CBC W/AUTO DIFF WBC: CPT

## 2025-04-30 PROCEDURE — 80061 LIPID PANEL: CPT

## 2025-04-30 PROCEDURE — 84443 ASSAY THYROID STIM HORMONE: CPT

## 2025-04-30 PROCEDURE — 36415 COLL VENOUS BLD VENIPUNCTURE: CPT

## 2025-04-30 PROCEDURE — 84439 ASSAY OF FREE THYROXINE: CPT

## 2025-04-30 PROCEDURE — 80053 COMPREHEN METABOLIC PANEL: CPT

## 2025-04-30 PROCEDURE — 82533 TOTAL CORTISOL: CPT

## (undated) NOTE — MR AVS SNAPSHOT
After Visit Summary   9/11/2019    Zoë Tabares    MRN: [id number]           Visit Information     Date & Time  9/11/2019  9:15 AM Provider  Esdras Tran MD [place] [address], [facility]  Phone  [phone] 3. Enter your [employer] Activation Code exactly as it appears below. You will not need to use this code after you have completed the sign-up process. If you do not sign up before the expiration date, you must request a new code. [employer] Activation Code:  [place] [employer] Water is best for hydration Fast food. Eat at home when possible     Tips for increasing your physical activity – Adults who are physically active are less likely to develop some chronic diseases than adults who are inactive.      HOW TO GET STARTED: HOW Communicate with a [facility] physician or [facility]  online. The physician will respond and provide a treatment plan within a few hours.            Treatment for mild   illness or injury that does   not require immediate attention   VIDEO VISITS  Average cost  $35*

## (undated) NOTE — MR AVS SNAPSHOT
After Visit Summary   1/18/2022    Joe Andrade   MRN: [id number]           Visit Information     Date & Time  1/18/2022  9:00 AM Provider  Chetan Navarro MD [address]  Phone  [phone] METABOLIC PANEL (14) [5354810 CUSTOM]  1/18/2022 (Approximate) 1/18/2023    LIPID PANEL [1864626 CUSTOM]  1/18/2022 (Approximate) 1/18/2023    ZAINAB SCREENING BILAT (CPT=77067) [77170 CPT(R)]  1/18/2022 (Approximate) 1/18/2023    ZAIN[employer] SCREENING BILAT (CPT=770 code after you have completed the sign-up process. If you do not sign up before the expiration date, you must request a new code. [employer] Activation Code: [email or web address]  Expires: 3/4/2022  9:39 AM    4.  Enter your Zip Code and Date of Birth (mm/dd/yyyy) a No text in SmartText

## (undated) NOTE — Clinical Note
Brandyn, I saw Madina in the office today. She presents with a recurrent infection of her left axilla. She appears to have a sebaceous cyst at this location.   I am planning excision under local.  Thank you Farzana Darling